# Patient Record
Sex: FEMALE | Race: WHITE | NOT HISPANIC OR LATINO | Employment: OTHER | URBAN - METROPOLITAN AREA
[De-identification: names, ages, dates, MRNs, and addresses within clinical notes are randomized per-mention and may not be internally consistent; named-entity substitution may affect disease eponyms.]

---

## 2017-04-19 ENCOUNTER — CONVERSION ENCOUNTER (OUTPATIENT)
Dept: MAMMOGRAPHY | Facility: HOSPITAL | Age: 57
End: 2017-04-19

## 2019-06-10 ENCOUNTER — HOSPITAL ENCOUNTER (OUTPATIENT)
Dept: MAMMOGRAPHY | Facility: HOSPITAL | Age: 59
Discharge: HOME | End: 2019-06-10
Attending: FAMILY MEDICINE

## 2019-07-24 ENCOUNTER — HOSPITAL ENCOUNTER (OUTPATIENT)
Dept: LAB | Facility: HOSPITAL | Age: 59
Discharge: HOME | End: 2019-07-24
Attending: FAMILY MEDICINE

## 2019-07-24 LAB
25(OH)D3 SERPL-MCNC: 43.3 NG/ML (ref 30–100)
TSH SERPL-ACNC: 3.08 M[IU]/L (ref 0.27–4.2)

## 2019-09-04 ENCOUNTER — HOSPITAL ENCOUNTER (OUTPATIENT)
Dept: URGENT CARE | Facility: CLINIC | Age: 59
Discharge: HOME | End: 2019-09-04

## 2020-12-29 ENCOUNTER — HOSPITAL ENCOUNTER (OUTPATIENT)
Dept: LAB | Facility: HOSPITAL | Age: 60
Discharge: HOME | End: 2020-12-29
Attending: FAMILY MEDICINE

## 2020-12-29 LAB
25(OH)D3 SERPL-MCNC: 28.5 NG/ML (ref 30–100)
ALBUMIN SERPL-MCNC: 4.1 G/DL (ref 3.5–5)
ALBUMIN/GLOB SERPL: 1.5 {RATIO} (ref 1.4–2.6)
ALP SERPL-CCNC: 104 U/L (ref 53–141)
ALT SERPL-CCNC: 35 U/L (ref 10–40)
ANION GAP SERPL CALC-SCNC: 14 MMOL/L (ref 8–19)
AST SERPL-CCNC: 30 U/L (ref 15–50)
BILIRUB SERPL-MCNC: 0.24 MG/DL (ref 0.2–1.3)
BUN SERPL-MCNC: 16 MG/DL (ref 5–25)
BUN/CREAT SERPL: 18 {RATIO} (ref 6–20)
CALCIUM SERPL-MCNC: 9.4 MG/DL (ref 8.7–10.4)
CHLORIDE SERPL-SCNC: 105 MMOL/L (ref 99–111)
CHOLEST SERPL-MCNC: 224 MG/DL (ref 107–200)
CHOLEST/HDLC SERPL: 4.1 {RATIO} (ref 3–6)
CONV CO2: 28 MMOL/L (ref 22–32)
CONV TOTAL PROTEIN: 6.8 G/DL (ref 6.3–8.2)
CREAT UR-MCNC: 0.89 MG/DL (ref 0.5–0.9)
GFR SERPLBLD BASED ON 1.73 SQ M-ARVRAT: >60 ML/MIN/{1.73_M2}
GLOBULIN UR ELPH-MCNC: 2.7 G/DL (ref 2–3.5)
GLUCOSE SERPL-MCNC: 108 MG/DL (ref 65–99)
HDLC SERPL-MCNC: 54 MG/DL (ref 40–60)
LDLC SERPL CALC-MCNC: 128 MG/DL (ref 70–100)
OSMOLALITY SERPL CALC.SUM OF ELEC: 296 MOSM/KG (ref 273–304)
POTASSIUM SERPL-SCNC: 4.8 MMOL/L (ref 3.5–5.3)
SODIUM SERPL-SCNC: 142 MMOL/L (ref 135–147)
TRIGL SERPL-MCNC: 210 MG/DL (ref 40–150)
TSH SERPL-ACNC: 2.45 M[IU]/L (ref 0.27–4.2)
VLDLC SERPL-MCNC: 42 MG/DL (ref 5–37)

## 2021-05-13 ENCOUNTER — HOSPITAL ENCOUNTER (OUTPATIENT)
Dept: URGENT CARE | Facility: CLINIC | Age: 61
Discharge: HOME | End: 2021-05-13
Attending: EMERGENCY MEDICINE

## 2021-05-20 ENCOUNTER — OFFICE VISIT CONVERTED (OUTPATIENT)
Dept: FAMILY MEDICINE CLINIC | Facility: CLINIC | Age: 61
End: 2021-05-20
Attending: NURSE PRACTITIONER

## 2021-05-20 ENCOUNTER — HOSPITAL ENCOUNTER (OUTPATIENT)
Dept: FAMILY MEDICINE CLINIC | Facility: CLINIC | Age: 61
Discharge: HOME | End: 2021-05-20
Attending: NURSE PRACTITIONER

## 2021-05-20 LAB
25(OH)D3 SERPL-MCNC: 32.1 NG/ML (ref 30–100)
ALBUMIN SERPL-MCNC: 4.3 G/DL (ref 3.5–5)
ALBUMIN/GLOB SERPL: 1.2 {RATIO} (ref 1.4–2.6)
ALP SERPL-CCNC: 103 U/L (ref 53–141)
ALT SERPL-CCNC: 21 U/L (ref 10–40)
ANION GAP SERPL CALC-SCNC: 17 MMOL/L (ref 8–19)
AST SERPL-CCNC: 21 U/L (ref 15–50)
BASOPHILS # BLD AUTO: 0.05 10*3/UL (ref 0–0.2)
BASOPHILS NFR BLD AUTO: 1 % (ref 0–3)
BILIRUB SERPL-MCNC: 0.34 MG/DL (ref 0.2–1.3)
BUN SERPL-MCNC: 19 MG/DL (ref 5–25)
BUN/CREAT SERPL: 22 {RATIO} (ref 6–20)
CALCIUM SERPL-MCNC: 9.7 MG/DL (ref 8.7–10.4)
CHLORIDE SERPL-SCNC: 102 MMOL/L (ref 99–111)
CHOLEST SERPL-MCNC: 258 MG/DL (ref 107–200)
CHOLEST/HDLC SERPL: 4.5 {RATIO} (ref 3–6)
CONV ABS IMM GRAN: 0.01 10*3/UL (ref 0–0.2)
CONV CO2: 27 MMOL/L (ref 22–32)
CONV IMMATURE GRAN: 0.2 % (ref 0–1.8)
CONV TOTAL PROTEIN: 7.8 G/DL (ref 6.3–8.2)
CREAT UR-MCNC: 0.87 MG/DL (ref 0.5–0.9)
DEPRECATED RDW RBC AUTO: 44.9 FL (ref 36.4–46.3)
EOSINOPHIL # BLD AUTO: 0.24 10*3/UL (ref 0–0.7)
EOSINOPHIL # BLD AUTO: 4.7 % (ref 0–7)
ERYTHROCYTE [DISTWIDTH] IN BLOOD BY AUTOMATED COUNT: 13.6 % (ref 11.7–14.4)
GFR SERPLBLD BASED ON 1.73 SQ M-ARVRAT: >60 ML/MIN/{1.73_M2}
GLOBULIN UR ELPH-MCNC: 3.5 G/DL (ref 2–3.5)
GLUCOSE SERPL-MCNC: 110 MG/DL (ref 65–99)
HCT VFR BLD AUTO: 41.7 % (ref 37–47)
HDLC SERPL-MCNC: 57 MG/DL (ref 40–60)
HGB BLD-MCNC: 13.4 G/DL (ref 12–16)
LDLC SERPL CALC-MCNC: 166 MG/DL (ref 70–100)
LYMPHOCYTES # BLD AUTO: 1.69 10*3/UL (ref 1–5)
LYMPHOCYTES NFR BLD AUTO: 33.1 % (ref 20–45)
MCH RBC QN AUTO: 29 PG (ref 27–31)
MCHC RBC AUTO-ENTMCNC: 32.1 G/DL (ref 33–37)
MCV RBC AUTO: 90.3 FL (ref 81–99)
MONOCYTES # BLD AUTO: 0.53 10*3/UL (ref 0.2–1.2)
MONOCYTES NFR BLD AUTO: 10.4 % (ref 3–10)
NEUTROPHILS # BLD AUTO: 2.58 10*3/UL (ref 2–8)
NEUTROPHILS NFR BLD AUTO: 50.6 % (ref 30–85)
NRBC CBCN: 0 % (ref 0–0.7)
OSMOLALITY SERPL CALC.SUM OF ELEC: 295 MOSM/KG (ref 273–304)
PLATELET # BLD AUTO: 236 10*3/UL (ref 130–400)
PMV BLD AUTO: 11.2 FL (ref 9.4–12.3)
POTASSIUM SERPL-SCNC: 4.9 MMOL/L (ref 3.5–5.3)
RBC # BLD AUTO: 4.62 10*6/UL (ref 4.2–5.4)
SODIUM SERPL-SCNC: 141 MMOL/L (ref 135–147)
TRIGL SERPL-MCNC: 176 MG/DL (ref 40–150)
TSH SERPL-ACNC: 1.53 M[IU]/L (ref 0.27–4.2)
VLDLC SERPL-MCNC: 35 MG/DL (ref 5–37)
WBC # BLD AUTO: 5.1 10*3/UL (ref 4.8–10.8)

## 2021-05-21 LAB
DSDNA AB SER-ACNC: NEGATIVE [IU]/ML
ENA AB SER IA-ACNC: NEGATIVE {RATIO}

## 2021-05-23 ENCOUNTER — TRANSCRIBE ORDERS (OUTPATIENT)
Dept: ADMINISTRATIVE | Facility: HOSPITAL | Age: 61
End: 2021-05-23

## 2021-05-23 DIAGNOSIS — Z12.39 SCREENING BREAST EXAMINATION: Primary | ICD-10-CM

## 2021-06-05 NOTE — H&P
History and Physical      Patient Name: Ana Plasencia   Patient ID: [id number]   Sex: Female   YOB: 1960    Primary Care Provider: Nereyda PURI    Visit Date: May 20, 2021    Provider: JAXSON Schaffer   Location: [facility]   Location Address: [address] [place] [address]  [id number]   Location Phone: [phone]          Chief Complaint  · New Patient/Establish Care      History Of Present Illness  Ana Plasencia is a 61 year old /White female who presents for evaluation and treatment of:      New Patient/Establish Care  Previous PCP was Dr. Jimenez  PMx of HTN, Allergic Rhinitis, Arthritis, Hypothyroidism, and Vitamin D Deficiency  Last lab work done 12/29/20    Pt c/o blood pressure issues and needing lab work to check thyroid and Vitamin B and Vitamin D levels.    b/p elevated with school nurse x 2 and at urgent care x 2. Pt reports b/p at home was 170 systolic. pt thinks her dad had HTN.     Pt reported she was having discomfort in Rt eye, was seen by the school nurse. Told BP was elevated and needed to have eye checked further. Went to Urgent care and given Lotemax drops.  Then was told to follow up with Eye Dr.  Was told she probably had something in her eye, Has improved since and finished drops.    Pt also reported having glaucoma and was taking loratidine allergy med OTC. Pt is concerned about taking any longer due to her  read that she should not take if she has glaucoma.    Anxeity/depression - pt feels like she no longer needs medication. pt will reduce to 5 mg paxil daily and stop if doing well in 1 month.     flu shot-10/2020  Pap-2020 EPW  Mammo-6/10/19  Colon- 2020 Cologuard         Past Medical History  Disease Name Date Onset Notes   Allergic rhinitis --  --    Arthritis --  --    Back pain --  --    Colon cancer screening 2020 Cologuard- Negative   Essential hypertension --  --    Gout --  --    Hypothyroidism --   --    Memory loss/Forgetfulness --  --    Migraine headache --  --    Night sweats --  --    Pap smear for cervical cancer screening  EPW   Postmenopausal --  --    Screening Mammogram  --          Past Surgical History  Procedure Name Date Notes   Tonsilectomy --  --    [place] Tooth Extraction --  --          Medication List  Name Date Started Instructions   levothyroxine 50 mcg oral tablet  take 1 tablet (50 mcg) by oral route once daily   paroxetine HCl 10 mg oral tablet  take 1 tablet (10 mg) by oral route once daily   travoprost 0.004 % ophthalmic (eye) drops  instill 1 drop into affected eye(s) by ophthalmic route once daily in the evening   Vitamin D3 50 mcg (2,000 unit) oral tablet  take 1 tablet by oral route every other day         Allergy List  Allergen Name Date Reaction Notes   NO KNOWN DRUG ALLERGIES --  --  --          Family Medical History  Disease Name Relative/Age Notes   Family history of stroke Grandmother (maternal)/   --    Family history of heart disease Father/   --    Family history of diabetes mellitus Father/  Sister/   --    Family history of Alzheimer's disease Mother/   --          Reproductive History  Menstrual   Menopause Status: Postmenopausal Age Menopause: 52   Pregnancy Summary   Total Pregnancies: 1 Full Term: 0 Premature: 0   Ab Induced: 0 Ab Spontaneous: 0 Ectopics: 0   Multiples: 0 Livin         Social History  Finding Status Start/Stop Quantity Notes   Active but no formal exercise --  --/-- --  --    Alcohol Never --/-- --  --    Denies illicit substance abuse --  --/-- --  --    Denies substance abuse --  --/-- --  --    Teacher --  --/-- --  --    Tobacco Never --/-- --  --          Review of Systems  · Constitutional  o Denies  o : fatigue, fever, weight gain, weight loss, chills  · Cardiovascular  o Denies  o : chest Pain, palpitations, edema (swelling)  · Respiratory  o Denies  o : frequent cough, shortness of breath  · Gastrointestinal  o Denies  o :  "nausea, vomiting, changes in bowel habits  · Genitourinary  o Denies  o : dysuria, urinary frequency, urinary urgency, polyuria  · Neurologic  o Admits  o : headache  o Denies  o : tingling or numbness, dizziness  · Musculoskeletal  o Denies  o : joint pain, myalgias  · Endocrine  o Denies  o : polydipsia, polyphagia  · Psychiatric  o Denies  o : mood changes, memory changes, SI/HI  · Allergic-Immunologic  o Admits  o : seasonal allergies  o Denies  o : eczema, urticaria      Vitals  Date Time BP Position Site L\R Cuff Size HR RR TEMP (F) WT  HT  BMI kg/m2 BSA m2 O2 Sat FR L/min FiO2 HC       08/22/2014 04:22 /74 Sitting    104 - R 20 97.5 196lbs 7oz 5'  4\" 33.72 2.01 96 %      05/20/2021 08:45 /59 Sitting    68 - R 18 98.7 211lbs 4oz 5'  4\" 36.26 2.08 97 %            Physical Examination  · Constitutional  o Appearance  o : well-nourished, in no acute distress  · Eyes  o Conjunctivae  o : conjunctivae normal  o Sclerae  o : sclerae white  o Pupils and Irises  o : pupils equal and round  o Eyelids/Ocular Adnexae  o : eyelid appearance normal, no exudates present  · Neck  o Inspection/Palpation  o : normal appearance, no masses or tenderness, trachea midline  o Range of Motion  o : cervical range of motion within normal limits  o Thyroid  o : gland size normal, nontender, no nodules or masses present on palpation  · Respiratory  o Respiratory Effort  o : breathing unlabored  o Inspection of Chest  o : normal appearance  o Auscultation of Lungs  o : normal breath sounds throughout inspiration and expiration  · Cardiovascular  o Heart  o :   § Auscultation of Heart  § : regular rate and rhythm, no murmurs, gallops or rubs  o Peripheral Vascular System  o :   § Carotid Arteries  § : normal pulses bilaterally, no bruits present  § Extremities  § : no clubbing or edema  · Gastrointestinal  o Abdominal Examination  o : abdomen nontender to palpation, tone normal without rigidity or guarding, no masses present, " bowel sounds present  · Skin and Subcutaneous Tissue  o General Inspection  o : no rashes or lesions present, no areas of discoloration  o Body Hair  o : hair normal for age, general body hair distribution normal for age  o Digits and Nails  o : no clubbing, cyanosis, deformities or edema present, normal appearing nails  · Neurologic  o Mental Status Examination  o :   § Orientation  § : grossly oriented to person, place and time  o Gait and Station  o : normal gait, able to stand without difficulty  · Psychiatric  o Judgement and Insight  o : judgment and insight intact  o Mood and Affect  o : mood normal, affect appropriate              Assessment  · Screening for depression     V79.0/Z13.89  · Visit for screening mammogram     V76.12/Z12.31  · Allergic rhinitis due to allergen     477.9/J30.9  · Depression     311/F32.9  · Essential hypertension     401.9/I10  · Fatigue     780.79/R53.83  · Hypothyroidism     244.9/E03.9  · Vitamin D deficiency     268.9/E55.9  · Establishing care with new doctor, encounter for     V65.8/Z76.89  · Glaucoma     365.9/H40.9      Plan  · Orders  o Annual depression screening, 15 minutes (01318, ) - V79.0/Z13.89 - 05/20/2021  o ACO-18: Negative screen for clinical depression using a standardized tool () - V79.0/Z13.89 - 05/20/2021  o Screening Mammography; Bilateral 3D (32399, 48201, ) - V76.12/Z12.31 - 05/20/2021  o Vitamin D Level (94085) - 268.9/E55.9 - 05/20/2021  o ACO - Pt declines to or was not able to provide an Advance Care Plan or name a Surrogate Decision Maker (1124F) - - 05/20/2021  o Physical, Primary Care Panel (CBC, CMP, Lipid, TSH) [facility] (81429, 86314, 10171, 68368) - 401.9/I10, 244.9/E03.9, 268.9/E55.9 - 05/20/2021  o ACO-14: Influenza immunization administered or previously received [facility] () - - 05/20/2021  o ACO-39: Current medications updated and reviewed (1159F, ) - - 05/20/2021  o ARUNA (antinuclear antibody profile) by enzyme immunoassay  (41497) - 244.9/E03.9, 780.79/R53.83 - 05/20/2021  · Medications  o amlodipine 5 mg oral tablet   SIG: take 1 tablet (5 mg) by oral route once daily for 30 days   DISP: (30) Tablet with 5 refills  Adjusted on 05/20/2021     o Medications have been Reconciled  o Transition of Care or Provider Policy  · Instructions  o Depression Screen completed and scanned into the EMR under the designated folder within the patient's documents.  o Today's PHQ-9 result is _0__  o Patient was given an SSRI/SSNRI medication and warned of possible side effects of the medication including potential for increased risk of suicidal thoughts and feelings. Patient was instructed that if they begin to exhibit any of these effects they will discontinue the medication immediately and contact our office or the ER ASAP.  o Patient advised to monitor blood pressure (B/P) at home and journal readings. Patient informed that a B/P reading at home of more than 130/80 is considered hypertension. For readings greater zokn801/90 or higher patient is advised to follow up in the office with readings for management. Patient advised to limit sodium intake.  o Patient instructed/educated on their diet and exercise program.  o Patient was educated/instructed on their diagnosis, treatment and medications prior to discharge from the clinic today.  o Call the office with any concerns or questions.  · Disposition  o Return to Office in 6 months.            Electronically Signed by: JAXSON Schaffer -Author on May 20, 2021 09:30:33 AM

## 2021-06-18 ENCOUNTER — TELEPHONE (OUTPATIENT)
Dept: FAMILY MEDICINE CLINIC | Facility: CLINIC | Age: 61
End: 2021-06-18

## 2021-06-18 NOTE — TELEPHONE ENCOUNTER
Caller: Ana Plasencia    Relationship: Self    Best call back number: [phone]    What test was performed: BLOOD WORK     When was the test performed: PATIENT COULD NOT PROVIDE DATES, STATES IT WAS OVER A MONTH AGO     Where was the test performed: IN OFFICE

## 2021-06-18 NOTE — TELEPHONE ENCOUNTER
Called pt back, she had looked at her lab results on the portal from 5/20/21 and concerned that her vitamin D level was low and her cholesterol was elevated.-- Advised that Vitamin D was WNL, but can continue to take 2,000IU of Vitamin D as previously advised and cholesterol was abnormal (slightly elevated) but not enough for medication, work on low cholesterol diet and exercise to bring it down naturally.

## 2021-07-15 VITALS
TEMPERATURE: 98.7 F | WEIGHT: 211.25 LBS | RESPIRATION RATE: 18 BRPM | HEIGHT: 64 IN | BODY MASS INDEX: 36.06 KG/M2 | HEART RATE: 68 BPM | DIASTOLIC BLOOD PRESSURE: 59 MMHG | OXYGEN SATURATION: 97 % | SYSTOLIC BLOOD PRESSURE: 133 MMHG

## 2021-09-16 ENCOUNTER — HOSPITAL ENCOUNTER (OUTPATIENT)
Dept: MAMMOGRAPHY | Facility: HOSPITAL | Age: 61
Discharge: HOME | End: 2021-09-16
Admitting: NURSE PRACTITIONER

## 2021-09-16 DIAGNOSIS — Z12.39 SCREENING BREAST EXAMINATION: ICD-10-CM

## 2021-09-16 PROCEDURE — 77063 BREAST TOMOSYNTHESIS BI: CPT

## 2021-09-16 PROCEDURE — 77067 SCR MAMMO BI INCL CAD: CPT

## 2021-11-04 RX ORDER — PAROXETINE 10 MG/1
10 TABLET, FILM COATED ORAL DAILY
Qty: 90 TABLET | Refills: 0 | Status: SHIPPED | OUTPATIENT
Start: 2021-11-04 | End: 2021-11-22 | Stop reason: SDUPTHER

## 2021-11-04 NOTE — TELEPHONE ENCOUNTER
Caller: Ana Plasencia    Relationship: Self      Medication requested (name and dosage): PARoxetine (PAXIL) 10 MG tablet    Requested Prescriptions:   Requested Prescriptions     Pending Prescriptions Disp Refills   • PARoxetine (PAXIL) 10 MG tablet          Pharmacy where request should be sent: [address] - [phone]  - [phone]   [phone]    Best call back number: [phone]    Does the patient have less than a 3 day supply:  [x] Yes  [] No    David Umaña Rep   11/04/21 11:55 EDT

## 2021-11-22 ENCOUNTER — OFFICE VISIT (OUTPATIENT)
Dept: FAMILY MEDICINE CLINIC | Facility: CLINIC | Age: 61
End: 2021-11-22

## 2021-11-22 VITALS
WEIGHT: 218 LBS | SYSTOLIC BLOOD PRESSURE: 132 MMHG | TEMPERATURE: 99.7 F | DIASTOLIC BLOOD PRESSURE: 72 MMHG | OXYGEN SATURATION: 97 % | HEART RATE: 73 BPM | HEIGHT: 64 IN | BODY MASS INDEX: 37.22 KG/M2

## 2021-11-22 DIAGNOSIS — E03.9 HYPOTHYROIDISM, UNSPECIFIED TYPE: ICD-10-CM

## 2021-11-22 DIAGNOSIS — I10 ESSENTIAL HYPERTENSION: Primary | ICD-10-CM

## 2021-11-22 DIAGNOSIS — Z23 NEED FOR INFLUENZA VACCINATION: ICD-10-CM

## 2021-11-22 DIAGNOSIS — F33.41 RECURRENT MAJOR DEPRESSIVE DISORDER, IN PARTIAL REMISSION (HCC): ICD-10-CM

## 2021-11-22 DIAGNOSIS — E55.9 VITAMIN D DEFICIENCY: ICD-10-CM

## 2021-11-22 LAB
25(OH)D3 SERPL-MCNC: 37.3 NG/ML
ALBUMIN SERPL-MCNC: 4.5 G/DL (ref 3.5–5.2)
ALBUMIN/GLOB SERPL: 1.6 G/DL
ALP SERPL-CCNC: 123 U/L (ref 39–117)
ALT SERPL W P-5'-P-CCNC: 23 U/L (ref 1–33)
ANION GAP SERPL CALCULATED.3IONS-SCNC: 10.5 MMOL/L (ref 5–15)
AST SERPL-CCNC: 22 U/L (ref 1–32)
BASOPHILS # BLD AUTO: 0.04 10*3/MM3 (ref 0–0.2)
BASOPHILS NFR BLD AUTO: 0.7 % (ref 0–1.5)
BILIRUB SERPL-MCNC: 0.2 MG/DL (ref 0–1.2)
BUN SERPL-MCNC: 19 MG/DL (ref 6–20)
BUN/CREAT SERPL: 19.8 (ref 7–25)
CALCIUM SPEC-SCNC: 9.6 MG/DL (ref 8.6–10.5)
CHLORIDE SERPL-SCNC: 103 MMOL/L (ref 98–107)
CHOLEST SERPL-MCNC: 261 MG/DL (ref 0–200)
CO2 SERPL-SCNC: 26.5 MMOL/L (ref 22–29)
CREAT SERPL-MCNC: 0.96 MG/DL (ref 0.57–1)
DEPRECATED RDW RBC AUTO: 44.3 FL (ref 37–54)
EOSINOPHIL # BLD AUTO: 0.26 10*3/MM3 (ref 0–0.4)
EOSINOPHIL NFR BLD AUTO: 4.5 % (ref 0.3–6.2)
ERYTHROCYTE [DISTWIDTH] IN BLOOD BY AUTOMATED COUNT: 13.8 % (ref 12.3–15.4)
GFR SERPL CREATININE-BSD FRML MDRD: 59 ML/MIN/1.73
GLOBULIN UR ELPH-MCNC: 2.9 GM/DL
GLUCOSE SERPL-MCNC: 91 MG/DL (ref 65–99)
HCT VFR BLD AUTO: 37.7 % (ref 34–46.6)
HDLC SERPL-MCNC: 56 MG/DL (ref 40–60)
HGB BLD-MCNC: 12.4 G/DL (ref 12–15.9)
IMM GRANULOCYTES # BLD AUTO: 0.01 10*3/MM3 (ref 0–0.05)
IMM GRANULOCYTES NFR BLD AUTO: 0.2 % (ref 0–0.5)
LDLC SERPL CALC-MCNC: 160 MG/DL (ref 0–100)
LDLC/HDLC SERPL: 2.79 {RATIO}
LYMPHOCYTES # BLD AUTO: 1.88 10*3/MM3 (ref 0.7–3.1)
LYMPHOCYTES NFR BLD AUTO: 32.5 % (ref 19.6–45.3)
MCH RBC QN AUTO: 28.8 PG (ref 26.6–33)
MCHC RBC AUTO-ENTMCNC: 32.9 G/DL (ref 31.5–35.7)
MCV RBC AUTO: 87.7 FL (ref 79–97)
MONOCYTES # BLD AUTO: 0.56 10*3/MM3 (ref 0.1–0.9)
MONOCYTES NFR BLD AUTO: 9.7 % (ref 5–12)
NEUTROPHILS NFR BLD AUTO: 3.04 10*3/MM3 (ref 1.7–7)
NEUTROPHILS NFR BLD AUTO: 52.4 % (ref 42.7–76)
NRBC BLD AUTO-RTO: 0 /100 WBC (ref 0–0.2)
PLATELET # BLD AUTO: 221 10*3/MM3 (ref 140–450)
PMV BLD AUTO: 11.3 FL (ref 6–12)
POTASSIUM SERPL-SCNC: 4.4 MMOL/L (ref 3.5–5.2)
PROT SERPL-MCNC: 7.4 G/DL (ref 6–8.5)
RBC # BLD AUTO: 4.3 10*6/MM3 (ref 3.77–5.28)
SODIUM SERPL-SCNC: 140 MMOL/L (ref 136–145)
T4 FREE SERPL-MCNC: 1.09 NG/DL (ref 0.93–1.7)
TRIGL SERPL-MCNC: 245 MG/DL (ref 0–150)
TSH SERPL DL<=0.05 MIU/L-ACNC: 3.45 UIU/ML (ref 0.27–4.2)
VLDLC SERPL-MCNC: 45 MG/DL (ref 5–40)
WBC NRBC COR # BLD: 5.79 10*3/MM3 (ref 3.4–10.8)

## 2021-11-22 PROCEDURE — 80061 LIPID PANEL: CPT | Performed by: NURSE PRACTITIONER

## 2021-11-22 PROCEDURE — 90686 IIV4 VACC NO PRSV 0.5 ML IM: CPT | Performed by: NURSE PRACTITIONER

## 2021-11-22 PROCEDURE — 99214 OFFICE O/P EST MOD 30 MIN: CPT | Performed by: NURSE PRACTITIONER

## 2021-11-22 PROCEDURE — 84439 ASSAY OF FREE THYROXINE: CPT | Performed by: NURSE PRACTITIONER

## 2021-11-22 PROCEDURE — 80053 COMPREHEN METABOLIC PANEL: CPT | Performed by: NURSE PRACTITIONER

## 2021-11-22 PROCEDURE — 84443 ASSAY THYROID STIM HORMONE: CPT | Performed by: NURSE PRACTITIONER

## 2021-11-22 PROCEDURE — 90471 IMMUNIZATION ADMIN: CPT | Performed by: NURSE PRACTITIONER

## 2021-11-22 PROCEDURE — 82306 VITAMIN D 25 HYDROXY: CPT | Performed by: NURSE PRACTITIONER

## 2021-11-22 PROCEDURE — 85025 COMPLETE CBC W/AUTO DIFF WBC: CPT | Performed by: NURSE PRACTITIONER

## 2021-11-22 RX ORDER — AMLODIPINE BESYLATE 5 MG/1
5 TABLET ORAL DAILY
Qty: 90 TABLET | Refills: 1 | Status: SHIPPED | OUTPATIENT
Start: 2021-11-22 | End: 2022-01-26

## 2021-11-22 RX ORDER — AMLODIPINE BESYLATE 5 MG/1
TABLET ORAL
Qty: 30 TABLET | Refills: 1 | Status: SHIPPED | OUTPATIENT
Start: 2021-11-22 | End: 2021-11-22 | Stop reason: SDUPTHER

## 2021-11-22 RX ORDER — HYDROCHLOROTHIAZIDE 12.5 MG/1
12.5 TABLET ORAL DAILY
Qty: 90 TABLET | Refills: 1 | Status: SHIPPED | OUTPATIENT
Start: 2021-11-22 | End: 2022-06-02

## 2021-11-22 RX ORDER — PAROXETINE 10 MG/1
10 TABLET, FILM COATED ORAL DAILY
Qty: 90 TABLET | Refills: 1 | Status: SHIPPED | OUTPATIENT
Start: 2021-11-22 | End: 2022-02-16 | Stop reason: SDUPTHER

## 2021-11-22 NOTE — PATIENT INSTRUCTIONS
Edema    Edema is when you have too much fluid in your body or under your skin. Edema may make your legs, feet, and ankles swell up. Swelling is also common in looser tissues, like around your eyes. This is a common condition. It gets more common as you get older. There are many possible causes of edema. Eating too much salt (sodium) and being on your feet or sitting for a long time can cause edema in your legs, feet, and ankles. Hot weather may make edema worse.  Edema is usually painless. Your skin may look swollen or shiny.  Follow these instructions at home:  · Keep the swollen body part raised (elevated) above the level of your heart when you are sitting or lying down.  · Do not sit still or stand for a long time.  · Do not wear tight clothes. Do not wear garters on your upper legs.  · Exercise your legs. This can help the swelling go down.  · Wear elastic bandages or support stockings as told by your doctor.  · Eat a low-salt (low-sodium) diet to reduce fluid as told by your doctor.  · Depending on the cause of your swelling, you may need to limit how much fluid you drink (fluid restriction).  · Take over-the-counter and prescription medicines only as told by your doctor.  Contact a doctor if:  · Treatment is not working.  · You have heart, liver, or kidney disease and have symptoms of edema.  · You have sudden and unexplained weight gain.  Get help right away if:  · You have shortness of breath or chest pain.  · You cannot breathe when you lie down.  · You have pain, redness, or warmth in the swollen areas.  · You have heart, liver, or kidney disease and get edema all of a sudden.  · You have a fever and your symptoms get worse all of a sudden.  Summary  · Edema is when you have too much fluid in your body or under your skin.  · Edema may make your legs, feet, and ankles swell up. Swelling is also common in looser tissues, like around your eyes.  · Raise (elevate) the swollen body part above the level of your  heart when you are sitting or lying down.  · Follow your doctor's instructions about diet and how much fluid you can drink (fluid restriction).  This information is not intended to replace advice given to you by your health care provider. Make sure you discuss any questions you have with your health care provider.  Document Revised: 12/21/2018 Document Reviewed: 01/05/2018  [employer] Patient Education © 2021 [employer]

## 2021-11-22 NOTE — PROGRESS NOTES
Chief Complaint  Follow-up, Hypertension, Hypothyroidism, and Depression    Subjective          Ana Plasencia is a 61 y.o. female who presents to [facility]    History of Present Illness    Hypothyroid - pt is taking thyroid med at bedtime.    HTN - well controlled. Pt reports compliance with medication.    Depression - pt reports doing well on medication. Depression well controlled.       PHQ-2 Total Score: 0   PHQ-9 Total Score: 0       Review of Systems   Constitutional: Negative for chills, fatigue and fever.   Respiratory: Negative for cough and shortness of breath.    Cardiovascular: Negative for chest pain and palpitations.   Gastrointestinal: Negative for constipation, diarrhea, nausea and vomiting.   Musculoskeletal: Negative for back pain and neck pain.   Skin: Negative for rash.   Neurological: Negative for dizziness and headaches.   Psychiatric/Behavioral: Negative for sleep disturbance and suicidal ideas. The patient is nervous/anxious.           Medical History: has a past medical history of Allergic rhinitis, Anxiety, Arthritis, Back pain, Depression, Disease of thyroid gland, Essential hypertension, Glaucoma (05/20/2021), Gout, Hypothyroidism, Memory loss, Migraine headache, Night sweats, and Post-menopausal.     Surgical History: has a past surgical history that includes Tonsillectomy and [place] tooth extraction.     Family History: family history includes Alzheimer's disease in her mother; Diabetes in her father and sister; Heart disease in her father; Hypertension in her mother; Stroke in her maternal grandmother.     Social History: reports that she has never smoked. She has never used smokeless tobacco. She reports that she does not drink alcohol and does not use drugs.    Allergies: Patient has no known allergies.      Health Maintenance Due   Topic Date Due   • ANNUAL PHYSICAL  Never done   • TDAP/TD VACCINES (1 - Tdap) Never done   • ZOSTER VACCINE (1 of  "2) Never done   • HEPATITIS C SCREENING  Never done   • PAP SMEAR  Never done   • INFLUENZA VACCINE  08/01/2021   • COVID-19 Vaccine (3 - Booster for Moderna series) 08/09/2021            Current Outpatient Medications:   •  amLODIPine (NORVASC) 5 MG tablet, Take 1 tablet by mouth Daily., Disp: 90 tablet, Rfl: 1  •  bimatoprost (LUMIGAN) 0.01 % ophthalmic drops, 1 drop Every Night., Disp: , Rfl:   •  Cholecalciferol 50 MCG (2000 UT) tablet, Vitamin D3 50 mcg (2,000 unit) oral tablet take 1 tablet by oral route every other day   Active, Disp: , Rfl:   •  levothyroxine (SYNTHROID, LEVOTHROID) 50 MCG tablet, Take 50 mcg by mouth Daily., Disp: , Rfl:   •  Loratadine (Claritin) 10 MG capsule, Take  by mouth., Disp: , Rfl:   •  PARoxetine (PAXIL) 10 MG tablet, Take 1 tablet by mouth Daily., Disp: 90 tablet, Rfl: 1  •  hydroCHLOROthiazide (HYDRODIURIL) 12.5 MG tablet, Take 1 tablet by mouth Daily., Disp: 90 tablet, Rfl: 1  •  ketotifen (ZADITOR) 0.025 % ophthalmic solution, , Disp: , Rfl:   •  Lotemax SM 0.38 % gel, INSTILL ONE DROP RIGHT EYE FOUR TIMES DAILY FOR 5 DAYS THEN TWICE DAILY FOR 5 DAYS, Disp: , Rfl:   •  travoprost, KAREN free, (TRAVATAN) 0.004 % solution ophthalmic solution, , Disp: , Rfl:       Immunization History   Administered Date(s) Administered   • COVID-19 (MODERNA) 1st, 2nd, 3rd Dose Only 01/12/2021, 02/09/2021   • DTaP 09/04/2019   • Flu Vaccine Quad PF >18YRS 10/01/2020         Objective       Vitals:    11/22/21 1505   BP: 132/72   BP Location: Right arm   Patient Position: Sitting   Cuff Size: Adult   Pulse: 73   Temp: 99.7 °F (37.6 °C)   TempSrc: Temporal   SpO2: 97%   Weight: 98.9 kg (218 lb)   Height: 162.6 cm (64.02\")      Body mass index is 37.4 kg/m².   Wt Readings from Last 3 Encounters:   11/22/21 98.9 kg (218 lb)   08/13/21 95.6 kg (210 lb 11.2 oz)   07/31/21 96.7 kg (213 lb 1.6 oz)      BP Readings from Last 3 Encounters:   11/22/21 132/72   08/13/21 161/86   07/31/21 145/71    "     Physical Exam  Vitals reviewed.   Constitutional:       Appearance: Normal appearance. She is well-developed.   HENT:      Head: Normocephalic and atraumatic.   Eyes:      Conjunctiva/sclera: Conjunctivae normal.      Pupils: Pupils are equal, round, and reactive to light.   Cardiovascular:      Rate and Rhythm: Normal rate and regular rhythm.      Heart sounds: Normal heart sounds. No murmur heard.      Pulmonary:      Effort: Pulmonary effort is normal.      Breath sounds: Normal breath sounds. No wheezing or rhonchi.   Abdominal:      General: Bowel sounds are normal. There is no distension.      Palpations: Abdomen is soft.      Tenderness: There is no abdominal tenderness.   Musculoskeletal:      Right lower le+ Edema present.      Left lower le+ Edema present.   Skin:     General: Skin is warm and dry.   Neurological:      Mental Status: She is alert and oriented to person, place, and time.   Psychiatric:         Mood and Affect: Mood and affect normal.         Behavior: Behavior normal.         Thought Content: Thought content normal.         Judgment: Judgment normal.       Result Review :     Common labs    Common Labsle 20    1124 1124    Glucose 108 (A)  110 (A)   BUN 16  19   Creatinine 0.89  0.87   Sodium 142  141   Potassium 4.8  4.9   Chloride 105  102   Calcium 9.4  9.7   Albumin 4.1  4.3   Total Bilirubin 0.24  0.34   Alkaline Phosphatase 104  103   AST (SGOT) 30  21   ALT (SGPT) 35  21   WBC   5.10   Hemoglobin   13.4   Hematocrit   41.7   Platelets   236   Total Cholesterol  224 (A) 258 (A)   Triglycerides  210 (A) 176 (A)   HDL Cholesterol  54 57   LDL Cholesterol   128 (A) 166 (A)   (A) Abnormal value       Comments are available for some flowsheets but are not being displayed.                      Assessment and Plan        Diagnoses and all orders for this visit:    1. Essential hypertension (Primary)  -     CBC and Differential  -     Comprehensive metabolic  panel  -     Lipid panel  -     amLODIPine (NORVASC) 5 MG tablet; Take 1 tablet by mouth Daily.  Dispense: 90 tablet; Refill: 1  -     hydroCHLOROthiazide (HYDRODIURIL) 12.5 MG tablet; Take 1 tablet by mouth Daily.  Dispense: 90 tablet; Refill: 1    2. Hypothyroidism, unspecified type  -     T4, free  -     TSH    3. Vitamin D deficiency  -     Vitamin D 25 hydroxy    4. Need for influenza vaccination  -     FluLaval/Fluarix/Fluzone >6 Months (7214-9478)    5. Recurrent major depressive disorder, in partial remission (HCC)  -     PARoxetine (PAXIL) 10 MG tablet; Take 1 tablet by mouth Daily.  Dispense: 90 tablet; Refill: 1          Follow Up     Return in about 6 months (around 5/22/2022) for Next scheduled follow up.    Patient was given instructions and counseling regarding her condition or for health maintenance advice. Please see specific information pulled into the AVS if appropriate.     JAXSON Schaffer

## 2021-11-24 DIAGNOSIS — E78.5 HYPERLIPIDEMIA, UNSPECIFIED HYPERLIPIDEMIA TYPE: Primary | ICD-10-CM

## 2021-11-24 RX ORDER — PRAVASTATIN SODIUM 20 MG
20 TABLET ORAL NIGHTLY
Qty: 30 TABLET | Refills: 2 | Status: SHIPPED | OUTPATIENT
Start: 2021-11-24 | End: 2022-04-04

## 2022-01-22 ENCOUNTER — APPOINTMENT (OUTPATIENT)
Dept: GENERAL RADIOLOGY | Facility: HOSPITAL | Age: 62
End: 2022-01-22

## 2022-01-22 ENCOUNTER — HOSPITAL ENCOUNTER (EMERGENCY)
Facility: HOSPITAL | Age: 62
Discharge: HOME | End: 2022-01-22
Attending: EMERGENCY MEDICINE | Admitting: EMERGENCY MEDICINE

## 2022-01-22 VITALS
HEART RATE: 83 BPM | OXYGEN SATURATION: 100 % | RESPIRATION RATE: 16 BRPM | SYSTOLIC BLOOD PRESSURE: 177 MMHG | TEMPERATURE: 97.6 F | DIASTOLIC BLOOD PRESSURE: 75 MMHG | HEIGHT: 64 IN | BODY MASS INDEX: 36.62 KG/M2 | WEIGHT: 214.51 LBS

## 2022-01-22 DIAGNOSIS — S43.015A ANTERIOR SHOULDER DISLOCATION, LEFT, INITIAL ENCOUNTER: Primary | ICD-10-CM

## 2022-01-22 PROCEDURE — 25010000002 HYDROMORPHONE 1 MG/ML SOLUTION: Performed by: EMERGENCY MEDICINE

## 2022-01-22 PROCEDURE — 96376 TX/PRO/DX INJ SAME DRUG ADON: CPT

## 2022-01-22 PROCEDURE — 96374 THER/PROPH/DIAG INJ IV PUSH: CPT

## 2022-01-22 PROCEDURE — 73030 X-RAY EXAM OF SHOULDER: CPT

## 2022-01-22 PROCEDURE — 96375 TX/PRO/DX INJ NEW DRUG ADDON: CPT

## 2022-01-22 PROCEDURE — 99283 EMERGENCY DEPT VISIT LOW MDM: CPT

## 2022-01-22 RX ORDER — ETOMIDATE 2 MG/ML
16 INJECTION INTRAVENOUS ONCE
Status: COMPLETED | OUTPATIENT
Start: 2022-01-22 | End: 2022-01-22

## 2022-01-22 RX ORDER — HYDROCODONE BITARTRATE AND ACETAMINOPHEN 5; 325 MG/1; MG/1
1 TABLET ORAL EVERY 6 HOURS PRN
Qty: 15 TABLET | Refills: 0 | Status: SHIPPED | OUTPATIENT
Start: 2022-01-22 | End: 2022-01-22 | Stop reason: SDUPTHER

## 2022-01-22 RX ORDER — HYDROCODONE BITARTRATE AND ACETAMINOPHEN 5; 325 MG/1; MG/1
1 TABLET ORAL EVERY 6 HOURS PRN
Qty: 15 TABLET | Refills: 0 | Status: SHIPPED | OUTPATIENT
Start: 2022-01-22 | End: 2022-06-20

## 2022-01-22 RX ADMIN — HYDROMORPHONE HYDROCHLORIDE 1 MG: 1 INJECTION, SOLUTION INTRAMUSCULAR; INTRAVENOUS; SUBCUTANEOUS at 17:36

## 2022-01-22 RX ADMIN — ETOMIDATE 10 MG: 40 INJECTION, SOLUTION INTRAVENOUS at 18:52

## 2022-01-22 RX ADMIN — HYDROMORPHONE HYDROCHLORIDE 1 MG: 1 INJECTION, SOLUTION INTRAMUSCULAR; INTRAVENOUS; SUBCUTANEOUS at 18:16

## 2022-01-22 NOTE — ED PROVIDER NOTES
Subjective   Patient presents complaining of sudden onset of severe right shoulder pain following a mechanical fall when she slipped on ice earlier today.  She denies additional injury she denies having struck her head.  Has pain with attempts to move the arm.  She denies numbness or weakness to the hand.          Review of Systems   Constitutional: Negative for chills and fever.   HENT: Negative for congestion, ear pain and sore throat.    Eyes: Negative for pain.   Respiratory: Negative for cough, chest tightness and shortness of breath.    Cardiovascular: Negative for chest pain.   Gastrointestinal: Negative for abdominal pain, diarrhea, nausea and vomiting.   Genitourinary: Negative for flank pain and hematuria.   Musculoskeletal: Negative for joint swelling.   Skin: Negative for pallor.   Neurological: Negative for seizures and headaches.   All other systems reviewed and are negative.      Past Medical History:   Diagnosis Date   • Allergic rhinitis    • Anxiety    • Arthritis    • Back pain    • Depression     EMOTION   • Disease of thyroid gland    • Essential hypertension    • Glaucoma 05/20/2021   • Gout    • Hypothyroidism    • Memory loss     MEMORY LOSS/FORGETFULNESS   • Migraine headache    • Night sweats    • Post-menopausal        No Known Allergies    Past Surgical History:   Procedure Laterality Date   • TONSILLECTOMY     • WISDOM TOOTH EXTRACTION         Family History   Problem Relation Age of Onset   • Alzheimer's disease Mother         FAMILY HISTORY OF ALZHEIMER'S DISEASE   • Hypertension Mother    • Diabetes Father         FAMILY HISTORY OF DIABETES MELLITUS   • Heart disease Father         FAMILY HISTORY OF HEART DISEASE   • Diabetes Sister         FAMILY HISTORY OF DIABETES MELLITUS   • Stroke Maternal Grandmother         FAMILY HISTORY OF STROKE       Social History     Socioeconomic History   • Marital status:    Tobacco Use   • Smoking status: Never Smoker   • Smokeless tobacco:  "Never Used   Vaping Use   • Vaping Use: Never used   Substance and Sexual Activity   • Alcohol use: Never   • Drug use: Never           Objective   Physical Exam  Constitutional:       Appearance: Normal appearance.   HENT:      Head: Normocephalic and atraumatic.      Nose: Nose normal.      Mouth/Throat:      Mouth: Mucous membranes are moist.   Eyes:      Extraocular Movements: Extraocular movements intact.      Conjunctiva/sclera: Conjunctivae normal.      Pupils: Pupils are equal, round, and reactive to light.   Cardiovascular:      Rate and Rhythm: Normal rate and regular rhythm.      Pulses: Normal pulses.      Heart sounds: Normal heart sounds.   Pulmonary:      Effort: Pulmonary effort is normal.      Breath sounds: Normal breath sounds. No wheezing.   Abdominal:      Palpations: Abdomen is soft.      Tenderness: There is no abdominal tenderness.   Musculoskeletal:         General: Normal range of motion.      Cervical back: Normal range of motion and neck supple.      Right lower leg: No edema.      Left lower leg: No edema.      Comments: There is tenderness to the lateral aspect of the right shoulder without associated deformity.   Skin:     General: Skin is warm and dry.      Capillary Refill: Capillary refill takes less than 2 seconds.      Findings: No rash.   Neurological:      General: No focal deficit present.      Mental Status: She is alert and oriented to person, place, and time. Mental status is at baseline.      Cranial Nerves: No cranial nerve deficit.      Sensory: No sensory deficit.      Motor: No weakness.   Psychiatric:         Mood and Affect: Mood normal.         Behavior: Behavior normal.         Procedures  Following informed consent patient underwent conscious sedation with administration of intravenous etomidate.  After adequate sedation was obtained using traction countertraction the dislocated shoulder was reduced.  Patient tolerated procedure well and post procedure x-rays of\" " stated anatomic alignment of the shoulder with patient neurovascular intact distal to the shoulder.  The shoulder was immobilized and the patient was discharged stable.         ED Course                                                 MDM    Final diagnoses:   Anterior shoulder dislocation, left, initial encounter       ED Disposition  ED Disposition     ED Disposition Condition Comment    Discharge Stable           Colton Anderson MD  [address] [place] [address]  [place] [address]  [phone]    Schedule an appointment as soon as possible for a visit            Medication List      New Prescriptions    HYDROcodone-acetaminophen 5-325 MG per tablet  Commonly known as: NORCO  Take 1 tablet by mouth Every 6 (Six) Hours As Needed for Moderate Pain .           Where to Get Your Medications      These medications were sent to [id number] - [place], [place] - [address] AT [facility] - [phone]  - [phone] FX  [address] [place] [address]    Phone: [phone]   · HYDROcodone-acetaminophen 5-325 MG per tablet          Buster Lau MD  01/22/22 2055

## 2022-01-23 NOTE — DISCHARGE INSTRUCTIONS
Wear the immobilizer however you may remove it to take a shower.  Ice to the shoulder as needed for discomfort.

## 2022-01-24 ENCOUNTER — TELEPHONE (OUTPATIENT)
Dept: ORTHOPEDIC SURGERY | Facility: CLINIC | Age: 62
End: 2022-01-24

## 2022-01-24 NOTE — TELEPHONE ENCOUNTER
REF. FROM [facility]- DX. - RIGHT SHOULDER DISLOCATION- ER PUT BACK IN PLACE, RIGHT SHOULDER PAIN- PRG. NOTES- 01/22/22- [facility] NOTES- IMAGING- 01/22/22- RIGHT SHOULDER XRAY

## 2022-01-25 ENCOUNTER — OFFICE VISIT (OUTPATIENT)
Dept: ORTHOPEDIC SURGERY | Facility: CLINIC | Age: 62
End: 2022-01-25

## 2022-01-25 VITALS — HEART RATE: 100 BPM | OXYGEN SATURATION: 96 % | BODY MASS INDEX: 37.36 KG/M2 | HEIGHT: 64 IN | WEIGHT: 218.8 LBS

## 2022-01-25 DIAGNOSIS — S43.004A SHOULDER DISLOCATION, RIGHT, INITIAL ENCOUNTER: Primary | ICD-10-CM

## 2022-01-25 DIAGNOSIS — I10 ESSENTIAL HYPERTENSION: ICD-10-CM

## 2022-01-25 PROCEDURE — 99203 OFFICE O/P NEW LOW 30 MIN: CPT | Performed by: ORTHOPAEDIC SURGERY

## 2022-01-25 NOTE — PROGRESS NOTES
"Chief Complaint  Pain of the Right Shoulder     Subjective      Ana Plasencia presents to [facility] for patient presents for evaluation of right shoulder pain, right shoulder dislocation,.  Patient states on 1/22/2022 she was at her father's house she states she was walking on the sidewalk slipped on ice and fell and landed on her right shoulder.  Patient states she had severe right shoulder pain she went to the ER, they did x-rays revealing anterior shoulder dislocation, her shoulder was reduced in the ER she states she is having trouble moving her shoulder secondary to her pain and inability to move it.  She states her elbow wrist and hand are doing well.  Patient presents in her sling, they gave her [place] from the ER.  Patient denies numbness and tingling, denies swelling or bruising.  Patient denies prior surgery or dislocation to her shoulder.    No Known Allergies     Social History     Socioeconomic History   • Marital status:    Tobacco Use   • Smoking status: Never Smoker   • Smokeless tobacco: Never Used   Vaping Use   • Vaping Use: Never used   Substance and Sexual Activity   • Alcohol use: Never   • Drug use: Never        Review of Systems     Objective   Vital Signs:   Pulse 100   Ht 162.6 cm (64\")   Wt 99.2 kg (218 lb 12.8 oz)   SpO2 96%   BMI 37.56 kg/m²       Physical Exam  Constitutional:       Appearance: Normal appearance. The patient is well-developed and normal weight.   HENT:      Head: Normocephalic.      Right Ear: Hearing and external ear normal.      Left Ear: Hearing and external ear normal.      Nose: Nose normal.   Eyes:      Conjunctiva/sclera: Conjunctivae normal.   Cardiovascular:      Rate and Rhythm: Normal rate.   Pulmonary:      Effort: Pulmonary effort is normal.      Breath sounds: No wheezing or rales.   Abdominal:      Palpations: Abdomen is soft.      Tenderness: There is no abdominal tenderness.   Musculoskeletal:      Cervical " back: Normal range of motion.   Skin:     Findings: No rash.   Neurological:      Mental Status: The patient is alert and oriented to person, place, and time.   Psychiatric:         Mood and Affect: Mood and affect normal.         Judgment: Judgment normal.       Ortho Exam      Right shoulder: Skin is intact, no erythema, no ecchymosis, no swelling, tenderness to palpation of anterior lateral shoulder, pain with attempted shoulder range of motion, patient is not tolerating active or passive range of motion of her shoulder, elbow wrist hand range of motion appropriate, neurovascularly intact, patient will wiggle fingers and thumb, touch his thumb to each finger, 2+ capillary refill, positive pulses.    Procedures        Imaging Results (Most Recent)     None           Result Review :       XR Shoulder 2+ View Right    Result Date: 1/22/2022  Narrative: PROCEDURE: XR SHOULDER 2+ VW RIGHT  COMPARISON: [facility], [place], XR SHOULDER 2+ VW RIGHT, 1/22/2022, 16:55.  INDICATIONS: RIGHT SHOULDER POST REDUCTION.  FINDINGS: Two views are provided for review.  After closed reduction of the anterior right shoulder dislocation, there is anatomic alignment.  No definite acute fracture is seen.  There may be mild degenerative changes involving the right shoulder.      Impression:  After closed reduction of the anterior right glenohumeral dislocation, there is anatomic alignment.  No definite acute fracture is appreciated.     ROS SAAB JR, MD       Electronically Signed and Approved By: ROS SAAB JR, MD on 1/22/2022 at 19:48             XR Shoulder 2+ View Right    Result Date: 1/22/2022  Narrative: PROCEDURE: XR SHOULDER 2+ VW RIGHT  COMPARISON: None  INDICATIONS: RIGHT SHOULDER PAIN POST FALL  FINDINGS:  The right femoral head is abnormally positioned consistent with anterior shoulder dislocation.  No visualized fracture.  The clavicle is intact.  AC joint maintained in alignment.  The right lung is clear.       Impression:  Anterior shoulder dislocation.      ARNOLD BAEZ MD       Electronically Signed and Approved By: ARNOLD BAEZ MD on 1/22/2022 at 17:15                      Assessment and Plan     DX: Right shoulder dislocation, right shoulder pain    Reviewed x-rays with the patient, advised her we recommend MRI for further evaluation, continue sling use, work on gentle range of motion of elbow hand and wrist, follow-up after MRI.  Patient agreed.    Call or return if worsening symptoms.    Follow Up     After MRI      Patient was given instructions and counseling regarding her condition or for health maintenance advice. Please see specific information pulled into the AVS if appropriate.     Scribed for Colton Anderson MD by ISAIAH Nelson.  01/25/22   13:46 EST    I have personally performed the services described in this document as scribed by the above individual and it is both accurate and complete. Colton Anderson MD 01/25/22

## 2022-01-26 RX ORDER — AMLODIPINE BESYLATE 5 MG/1
TABLET ORAL
Qty: 30 TABLET | Refills: 0 | Status: SHIPPED | OUTPATIENT
Start: 2022-01-26 | End: 2022-03-14

## 2022-02-03 RX ORDER — LEVOTHYROXINE SODIUM 0.05 MG/1
50 TABLET ORAL DAILY
Qty: 90 TABLET | Refills: 1 | Status: SHIPPED | OUTPATIENT
Start: 2022-02-03 | End: 2022-08-26

## 2022-02-03 NOTE — TELEPHONE ENCOUNTER
Caller: Ana Plasencia    Relationship: Self    Best call back number: [phone]     Requested Prescriptions:   Requested Prescriptions     Pending Prescriptions Disp Refills   • levothyroxine (SYNTHROID, LEVOTHROID) 50 MCG tablet       Sig: Take 1 tablet by mouth Daily.        Pharmacy where request should be sent: [address] [phone] [facility] [phone] FX     Additional details provided by patient: COMPLETELY  OUT, RECENT PCP PRESCRIBED RX.     Does the patient have less than a 3 day supply:  [x] Yes  [] No    David uY Rep   02/03/22 10:24 EST

## 2022-02-14 ENCOUNTER — HOSPITAL ENCOUNTER (OUTPATIENT)
Dept: MRI IMAGING | Facility: HOSPITAL | Age: 62
Discharge: HOME | End: 2022-02-14
Admitting: PHYSICIAN ASSISTANT

## 2022-02-14 DIAGNOSIS — S43.004A SHOULDER DISLOCATION, RIGHT, INITIAL ENCOUNTER: ICD-10-CM

## 2022-02-14 PROCEDURE — 73221 MRI JOINT UPR EXTREM W/O DYE: CPT

## 2022-02-16 ENCOUNTER — TELEPHONE (OUTPATIENT)
Dept: FAMILY MEDICINE CLINIC | Facility: CLINIC | Age: 62
End: 2022-02-16

## 2022-02-16 DIAGNOSIS — F33.41 RECURRENT MAJOR DEPRESSIVE DISORDER, IN PARTIAL REMISSION: ICD-10-CM

## 2022-02-16 RX ORDER — PAROXETINE 10 MG/1
10 TABLET, FILM COATED ORAL DAILY
Qty: 90 TABLET | Refills: 1 | Status: SHIPPED | OUTPATIENT
Start: 2022-02-16 | End: 2022-06-20 | Stop reason: SDUPTHER

## 2022-02-16 NOTE — TELEPHONE ENCOUNTER
Caller: Ana Plasencia    Relationship: Self    Best call back number: [phone]    Requested Prescriptions:   Requested Prescriptions     Pending Prescriptions Disp Refills   • PARoxetine (PAXIL) 10 MG tablet 90 tablet 1     Sig: Take 1 tablet by mouth Daily.        Pharmacy where request should be sent: [address] [phone] [facility] [phone] FX         Does the patient have less than a 3 day supply:  [x] Yes  [] No    David Umaña Rep   02/16/22 13:44 EST

## 2022-02-17 ENCOUNTER — OFFICE VISIT (OUTPATIENT)
Dept: ORTHOPEDIC SURGERY | Facility: CLINIC | Age: 62
End: 2022-02-17

## 2022-02-17 VITALS — WEIGHT: 225 LBS | BODY MASS INDEX: 38.41 KG/M2 | HEIGHT: 64 IN

## 2022-02-17 DIAGNOSIS — S42.211A CLOSED FRACTURE OF NECK OF RIGHT HUMERUS, INITIAL ENCOUNTER: ICD-10-CM

## 2022-02-17 DIAGNOSIS — S43.004A SHOULDER DISLOCATION, RIGHT, INITIAL ENCOUNTER: Primary | ICD-10-CM

## 2022-02-17 DIAGNOSIS — S46.011A TRAUMATIC INCOMPLETE TEAR OF RIGHT ROTATOR CUFF, INITIAL ENCOUNTER: ICD-10-CM

## 2022-02-17 DIAGNOSIS — S42.291A HUMERAL HEAD FRACTURE, RIGHT, CLOSED, INITIAL ENCOUNTER: ICD-10-CM

## 2022-02-17 PROCEDURE — 99213 OFFICE O/P EST LOW 20 MIN: CPT | Performed by: ORTHOPAEDIC SURGERY

## 2022-02-17 NOTE — PROGRESS NOTES
"Chief Complaint  Pain of the Right Shoulder     Subjective      Ana Plasencia presents to [facility] for follow up evaluation of the right shoulder. The patient sustained a right shoulder dislocation on 1/22/22 that was reduced in the ER. She recently had an MRI and is here today for those results. She has not being doing therapy. She is still having some decreased ROM.     No Known Allergies     Social History     Socioeconomic History   • Marital status:    Tobacco Use   • Smoking status: Never Smoker   • Smokeless tobacco: Never Used   Vaping Use   • Vaping Use: Never used   Substance and Sexual Activity   • Alcohol use: Never   • Drug use: Never        Review of Systems     Objective   Vital Signs:   Ht 162.6 cm (64\")   Wt 102 kg (225 lb)   BMI 38.62 kg/m²       Physical Exam  Constitutional:       Appearance: Normal appearance. The patient is well-developed and normal weight.   HENT:      Head: Normocephalic.      Right Ear: Hearing and external ear normal.      Left Ear: Hearing and external ear normal.      Nose: Nose normal.   Eyes:      Conjunctiva/sclera: Conjunctivae normal.   Cardiovascular:      Rate and Rhythm: Normal rate.   Pulmonary:      Effort: Pulmonary effort is normal.      Breath sounds: No wheezing or rales.   Abdominal:      Palpations: Abdomen is soft.      Tenderness: There is no abdominal tenderness.   Musculoskeletal:      Cervical back: Normal range of motion.   Skin:     Findings: No rash.   Neurological:      Mental Status: The patient is alert and oriented to person, place, and time.   Psychiatric:         Mood and Affect: Mood and affect normal.         Judgment: Judgment normal.       Ortho Exam      Right shoulder- Forward elevation 80. Abduction 85. External Rotation 60. Internal rotation 55. Sensation to light touch median, radial, ulnar nerve. Positive AIN, PIN, ulnar nerve. Positive pulses. 4+ supraspinatus strength. 5/5 infraspinatus  " And subscapularis     Procedures      Imaging Results (Most Recent)     None           Result Review :       XR Shoulder 2+ View Right    Result Date: 1/22/2022  Narrative: PROCEDURE: XR SHOULDER 2+ VW RIGHT  COMPARISON: [facility], , XR SHOULDER 2+ VW RIGHT, 1/22/2022, 16:55.  INDICATIONS: RIGHT SHOULDER POST REDUCTION.  FINDINGS: Two views are provided for review.  After closed reduction of the anterior right shoulder dislocation, there is anatomic alignment.  No definite acute fracture is seen.  There may be mild degenerative changes involving the right shoulder.      Impression:  After closed reduction of the anterior right glenohumeral dislocation, there is anatomic alignment.  No definite acute fracture is appreciated.     ROS SAAB JR, MD       Electronically Signed and Approved By: ROS SAAB JR, MD on 1/22/2022 at 19:48             XR Shoulder 2+ View Right    Result Date: 1/22/2022  Narrative: PROCEDURE: XR SHOULDER 2+ VW RIGHT  COMPARISON: None  INDICATIONS: RIGHT SHOULDER PAIN POST FALL  FINDINGS:  The right femoral head is abnormally positioned consistent with anterior shoulder dislocation.  No visualized fracture.  The clavicle is intact.  AC joint maintained in alignment.  The right lung is clear.      Impression:  Anterior shoulder dislocation.      ARNOLD BAEZ MD       Electronically Signed and Approved By: ARNOLD BAEZ MD on 1/22/2022 at 17:15             MRI Shoulder Right Without Contrast    Result Date: 2/15/2022  Narrative: PROCEDURE: MRI SHOULDER RIGHT WO CONTRAST  COMPARISON: [facility], , XR SHOULDER 2+ VW RIGHT, 1/22/2022, 19:05.  INDICATIONS: right shoulder dislocation and pain      TECHNIQUE: A variety of imaging planes and parameters were utilized for visualization of suspected pathology.  Images were performed without contrast.   FINDINGS:  Moderate T2 high signal consistent with edema is noted in the humeral greater tuberosity and humeral neck.   There is a nondisplaced fracture in the surgical neck.  Along the posterolateral aspect of the humeral head is an impaction fracture measuring 0.9 cm transverse and 0.4 cm in depth.  No other fractures identified.  Mild acromioclavicular osteoarthritis is noted.  There is a partial thickness articular surface tear of the supraspinatus tendon estimated to involve approximately 80% of the tendon thickness.  The rotator cuff otherwise appears unremarkable.  No muscle body atrophy is seen.  The biceps long head tendon and its attachment to the superior labrum are intact.  No labral tear is identified.  Cartilage in the glenohumeral joint is intact.  No significant joint effusion is seen.  No loose body is evident.      Impression:   1. Impaction fracture along the posterolateral aspect of the humeral head, as above 2. Nondisplaced fracture of the surgical neck with moderate associated marrow edema 3. Mild acromioclavicular osteoarthritis 4. Partial thickness articular surface tear of the supraspinatus tendon      Tom Richard M.D.       Electronically Signed and Approved By: Tom Richard M.D. on 2/15/2022 at 10:55                      Assessment and Plan     DX: Right shoulder dislocation. Humeral head fracture. Humeral neck fracture, partial rotator cuff tear    Discussed the treatment plan with the patient.  Plan for conservative treatment. Order for physical therapy given today.     Call or return if worsening symptoms.    Follow Up     4 weeks with repeat shoulder x-ray      Patient was given instructions and counseling regarding her condition or for health maintenance advice. Please see specific information pulled into the AVS if appropriate.     Scribed for Colton Anderson MD by Deidra Medrano.  02/17/22   15:49 EST    I have personally performed the services described in this document as scribed by the above individual and it is both accurate and complete. Colton Anderson MD 02/17/22

## 2022-03-11 DIAGNOSIS — I10 ESSENTIAL HYPERTENSION: ICD-10-CM

## 2022-03-14 RX ORDER — AMLODIPINE BESYLATE 5 MG/1
TABLET ORAL
Qty: 30 TABLET | Refills: 2 | Status: SHIPPED | OUTPATIENT
Start: 2022-03-14 | End: 2022-06-20 | Stop reason: SDUPTHER

## 2022-03-17 ENCOUNTER — OFFICE VISIT (OUTPATIENT)
Dept: ORTHOPEDIC SURGERY | Facility: CLINIC | Age: 62
End: 2022-03-17

## 2022-03-17 VITALS — HEART RATE: 78 BPM | OXYGEN SATURATION: 97 % | WEIGHT: 225 LBS | BODY MASS INDEX: 38.41 KG/M2 | HEIGHT: 64 IN

## 2022-03-17 DIAGNOSIS — S42.291A HUMERAL HEAD FRACTURE, RIGHT, CLOSED, INITIAL ENCOUNTER: ICD-10-CM

## 2022-03-17 DIAGNOSIS — S46.011D TRAUMATIC INCOMPLETE TEAR OF RIGHT ROTATOR CUFF, SUBSEQUENT ENCOUNTER: Primary | ICD-10-CM

## 2022-03-17 DIAGNOSIS — S42.211D CLOSED FRACTURE OF NECK OF RIGHT HUMERUS WITH ROUTINE HEALING, SUBSEQUENT ENCOUNTER: ICD-10-CM

## 2022-03-17 DIAGNOSIS — S43.004A SHOULDER DISLOCATION, RIGHT, INITIAL ENCOUNTER: ICD-10-CM

## 2022-03-17 PROCEDURE — 99213 OFFICE O/P EST LOW 20 MIN: CPT | Performed by: PHYSICIAN ASSISTANT

## 2022-03-17 NOTE — PROGRESS NOTES
"Chief Complaint  Pain of the Right Shoulder    Subjective          Ana Plasencia is a 62 y.o. female  presents to [facility] for   History of Present Illness    Patient presents for follow-up evaluation of right shoulder pain she had a right shoulder dislocation on 1/22/2022 she was last seen by Dr. Anderson on 2/17/2022 to review her MRI which revealed that she had a right shoulder dislocation/humeral head fracture, humeral neck fracture and partial rotator cuff tear.  Dr. Anderson recommended conservative treatment with starting physical therapy.  Patient states that physical therapy cannot start until about 3/21/2022 she states she is about to start therapy but she has mainly been doing gentle range of motion on her own.  Patient is a  she states that she has pain with certain movements but is able to do most of her work without difficulty.  No Known Allergies     Social History     Socioeconomic History   • Marital status:    Tobacco Use   • Smoking status: Never Smoker   • Smokeless tobacco: Never Used   Vaping Use   • Vaping Use: Never used   Substance and Sexual Activity   • Alcohol use: Never   • Drug use: Never        REVIEW OF SYSTEMS    Constitutional: Denies fevers, chills, weight loss  Cardiovascular: Denies chest pain, shortness of breath  Skin: Denies rashes, acute skin changes  Neurologic: Denies headache, loss of consciousness  MSK: Right shoulder pain      Objective   Vital Signs:   Pulse 78   Ht 162.6 cm (64\")   Wt 102 kg (225 lb)   SpO2 97%   BMI 38.62 kg/m²     Body mass index is 38.62 kg/m².    Physical Exam    Right shoulder: Active forward elevation 95, active abduction 85, external rotation with abduction 60, internal rotation to her side, sensation intact light touch, 4+ supraspinatus, 5 out of 5 infraspinatus and subscapularis.    Procedures    Imaging Results (Most Recent)     Procedure Component Value Units Date/Time "    XR Scapula Right [[id number]] Resulted: 03/17/22 1652     Updated: 03/17/22 1652    Narrative:      • View:AP and Lateral view(s)  • Site: Right shoulder  • Indication: Right shoulder pain  • Study: X-rays ordered, taken in the office, and reviewed today  • X-ray: Good healing of proximal humerus fracture, no increased   displacement or angulation  • Comparative data: No comparative data found             Result Review :   The following data was reviewed by: ISAIAH Nelson on 03/17/2022:  Data reviewed: Radiologic studies Reviewed by me with the patient             Assessment and Plan    Diagnoses and all orders for this visit:    1. Traumatic incomplete tear of right rotator cuff, subsequent encounter (Primary)    2. Closed fracture of neck of right humerus with routine healing, subsequent encounter    3. Humeral head fracture, right, closed, subsequent encounter  -     XR Scapula Right    4. Shoulder dislocation, right, subsequent encounter    5. Humeral head fracture, right, closed, initial encounter  -     XR Scapula Right        Discussed diagnosis and treatment options with the patient patient was advised we recommend continuing plan for physical therapy she will start on 3/21/2022 follow-up in 4 weeks for reevaluation with x-rays.    Call or return if worsening symptoms.    Follow Up   Return in about 4 weeks (around 4/14/2022) for Recheck.  Patient was given instructions and counseling regarding her condition or for health maintenance advice. Please see specific information pulled into the AVS if appropriate.

## 2022-03-21 ENCOUNTER — TREATMENT (OUTPATIENT)
Dept: PHYSICAL THERAPY | Facility: CLINIC | Age: 62
End: 2022-03-21

## 2022-03-21 DIAGNOSIS — M25.511 ACUTE PAIN OF RIGHT SHOULDER: Primary | ICD-10-CM

## 2022-03-21 DIAGNOSIS — M25.60 DECREASED RANGE OF MOTION: ICD-10-CM

## 2022-03-21 DIAGNOSIS — S42.291D HUMERAL HEAD FRACTURE, RIGHT, WITH ROUTINE HEALING, SUBSEQUENT ENCOUNTER: ICD-10-CM

## 2022-03-21 DIAGNOSIS — R29.898 WEAKNESS OF SHOULDER: ICD-10-CM

## 2022-03-21 PROCEDURE — 97161 PT EVAL LOW COMPLEX 20 MIN: CPT | Performed by: PHYSICAL THERAPIST

## 2022-03-21 PROCEDURE — 97110 THERAPEUTIC EXERCISES: CPT | Performed by: PHYSICAL THERAPIST

## 2022-03-21 NOTE — PROGRESS NOTES
Physical Therapy Initial Evaluation and Plan of Care    Patient: Ana Plasencia   : 1960  Diagnosis/ICD-10 Code:  Humeral head fracture, right, closed, initial encounter [S42.291A]  Referring practitioner: Colton Anderson MD  Date of Initial Visit: 3/21/2022  Today's Date: 3/21/2022  Patient seen for 1 sessions           Subjective Questionnaire: QuickDASH: 27/55 = 20-39%       Subjective Evaluation    History of Present Illness  Mechanism of injury: Pt reports on 2022 she fell on some ice and dislocated her R shoulder. Pt went to ED and had shoulder reduced. Pt had MRI that revealed impaction fracture along the posterolateral aspect of the humeral head, nondisplaced fracture of the surgical neck, partial thickness tear of supraspinatus tendon, and mild AC arthritis. Pt reports lifting her R UE above her head increases her pain in addition to reaching behind her back. Pt reports she is a  and reports she is able to work still with R shoulder pain.       Patient Occupation:  Pain  Current pain ratin  At best pain ratin  At worst pain ratin  Quality: sharp and discomfort  Aggravating factors: overhead activity, movement, outstretched reach and repetitive movement    Hand dominance: right    Diagnostic Tests  X-ray: abnormal  MRI studies: abnormal    Treatments  No previous or current treatments  Patient Goals  Patient goals for therapy: increased strength, increased motion, decreased pain and independence with ADLs/IADLs             Objective          Postural Observations  Seated posture: fair  Standing posture: fair        Palpation     Right Tenderness of the supraspinatus.     Tenderness     Right Shoulder  Tenderness in the bicipital groove.     Active Range of Motion   Left Shoulder   Flexion: WFL  Abduction: WFL  External rotation 0°: WFL  External rotation BTH: WFL    Right Shoulder   Flexion: 90 degrees   Abduction: 40 degrees   External  rotation 0°: 60 degrees   External rotation BTH: C2     Additional Active Range of Motion Details  R shoulder IR: R hip    Passive Range of Motion   Left Shoulder   Flexion: WFL  Abduction: WFL  External rotation 45°: WFL  Internal rotation 0°: WFL    Right Shoulder   Flexion: 130 degrees   Abduction: 90 degrees   External rotation 45°: 60 degrees   Internal rotation 0°: 60 degrees     Strength/Myotome Testing     Left Shoulder     Planes of Motion   Flexion: 5   Abduction: 5   External rotation at 0°: 5   Internal rotation at 0°: 5     Isolated Muscles   Biceps: 5   Triceps: 5     Right Shoulder     Planes of Motion   Flexion: 3+   Abduction: 3+   External rotation at 0°: 3+   Internal rotation at 0°: 4-     Isolated Muscles   Biceps: 4+   Triceps: 4+     Tests     Right Shoulder   Positive empty can, Hawkin's, Neer's and painful arc.   Negative drop arm, lift-off and Speed's.         See Exercise, Manual, and Modality Logs for complete treatment.       Assessment & Plan     Assessment  Impairments: abnormal or restricted ROM, impaired physical strength, lacks appropriate home exercise program and pain with function  Functional Limitations: carrying objects, lifting, uncomfortable because of pain, reaching behind back, reaching overhead and unable to perform repetitive tasks  Assessment details: Pt presents to PT post R shoulder dislocation. Pt has decrease ROM, decrease strength in R shoulder, and increase in R shoulder pain. Initiated patient's HEP focusing on improving patient's ROM and patient tolerated well. Will perform PT in order to improve patient's R shoulder ROM, strength, and decrease pain in order to return patient back to maximal functional capacity. Initiated HEP and patient tolerated well.  Prognosis: good    Goals  Plan Goals: SHOULDER PROBLEMS:    1. The patient has limited ROM of the right shoulder.    LTG 1: 12 weeks: The patient will demonstrate 170 degrees of right shoulder flexion, 170  degrees of shoulder abduction, 80 degrees of shoulder external rotation, and shoulder internal rotation to L1 to allow the patient to reach into upper kitchen cabinets and manipulate clothing behind the back with greater ease.    STG 1a: 6 weeks: The patient will demonstrate 140 degrees of right shoulder flexion, 140 degrees of shoulder abduction, and shoulder internal rotation to L5 to allow the patient to reach into upper kitchen cabinets and manipulate clothing behind the back with greater ease.      2. The patient has limited strength of the right shoulder.    LTG 2: 12 weeks: The patient will demonstrate 5/5 strength for right shoulder flexion, abduction, external rotation, and internal rotation in order to demonstrate improved shoulder stability.      STG 2a: 6 weeks: The patient will demonstrate 4+/5 strength for right shoulder flexion, abduction, external rotation, and internal rotation.      STG2b: 6 weeks: The patient will be independent with home exercises.      3. The patient complains of pain to the right shoulder.    LTG 3: 12 weeks: The patient will report a pain rating of 1/10 or better in order to improve sleep quality and tolerance to performance of activities of daily living.      STG 3a: 6 weeks: The patient will report a pain rating of 3/10 or better.      Carrying, Moving, and Handling Objects Functional Limitation    LTG 4: 12 weeks: The patient will demonstrate 0% limitation by achieving a score of 11 on the Quick DASH.    STG 4a: 6 weeks: The patient will demonstrate 1-19%% limitation by achieving a score of 12-19 on the Quick DASH.    Plan  Therapy options: will be seen for skilled therapy services  Planned modality interventions: cryotherapy, TENS and thermotherapy (hydrocollator packs)  Planned therapy interventions: body mechanics training, fine motor coordination training, flexibility, functional ROM exercises, home exercise program, joint mobilization, manual therapy, neuromuscular  re-education, postural training, soft tissue mobilization, spinal/joint mobilization, strengthening and therapeutic activities  Frequency: 2x week  Duration in weeks: 12  Treatment plan discussed with: patient        History # of Personal Factors and/or Comorbidities: LOW (0)  Examination of Body System(s): # of elements: LOW (1-2)  Clinical Presentation: STABLE   Clinical Decision Making: LOW       Timed:         Manual Therapy:    0     mins  33341;     Therapeutic Exercise:    8     mins  03833;     Neuromuscular Sheila:    0    mins  96877;    Therapeutic Activity:     0     mins  24342;     Gait Trainin     mins  76262;     Ultrasound:     0     mins  65708;    Ionto                               0    mins   81202  Self pay                         0     mins [email or web address]    Un-Timed:  Electrical Stimulation:    0     mins  23076 (MC )  Traction     0     mins 49984  Low Eval     40     Mins  31388  Mod Eval     0     Mins  28320  High Eval                       0     Mins  01844  Self Pay Eval                 0     PTSP1   Re-Eval                           0    mins  49380        Timed Treatment:   8   mins   Total Treatment:     48   mins    PT SIGNATURE: Electronically signed by Dahlia Dacosta PT  [place] LICENSE: [id number]    DATE TREATMENT INITIATED: 3/21/2022    Initial Certification  Certification Period: 3/21/2022 thru 2022  I certify that the therapy services are furnished while this patient is under my care.  The services outlined above are required by this patient, and will be reviewed every 90 days.     PHYSICIAN: Colton Anderson MD   NPI: [id number]      DATE:     Please sign and return via fax to [phone] Thank you, [facility].

## 2022-03-23 ENCOUNTER — TREATMENT (OUTPATIENT)
Dept: PHYSICAL THERAPY | Facility: CLINIC | Age: 62
End: 2022-03-23

## 2022-03-23 DIAGNOSIS — S42.291D HUMERAL HEAD FRACTURE, RIGHT, WITH ROUTINE HEALING, SUBSEQUENT ENCOUNTER: ICD-10-CM

## 2022-03-23 DIAGNOSIS — M25.511 ACUTE PAIN OF RIGHT SHOULDER: Primary | ICD-10-CM

## 2022-03-23 DIAGNOSIS — M25.60 DECREASED RANGE OF MOTION: ICD-10-CM

## 2022-03-23 DIAGNOSIS — R29.898 WEAKNESS OF SHOULDER: ICD-10-CM

## 2022-03-23 PROCEDURE — 97530 THERAPEUTIC ACTIVITIES: CPT | Performed by: PHYSICAL THERAPIST

## 2022-03-23 PROCEDURE — 97140 MANUAL THERAPY 1/> REGIONS: CPT | Performed by: PHYSICAL THERAPIST

## 2022-03-23 PROCEDURE — 97110 THERAPEUTIC EXERCISES: CPT | Performed by: PHYSICAL THERAPIST

## 2022-03-23 NOTE — PROGRESS NOTES
Physical Therapy Daily Progress Note    VISIT#: 2    Subjective   Ana Plasencia reports she has been performing HEP at home and reports her shoulder is feeling better.      Objective     See Exercise, Manual, and Modality Logs for complete treatment.     Assessment/Plan     Progressed patient's ROM exercises and patient tolerated well with improved ROM. Pt also improved with PROM of R shoulder. Will add more strengthening exercises next session to improve strength. Educated patient to continue HEP at home.      Progress per Plan of Care and Progress strengthening /stabilization /functional activity            Timed:                 Manual Therapy:    12     mins  53541;     Therapeutic Exercise:      8   mins  61157;     Neuromuscular Sheila:    0    mins  89499;    Therapeutic Activity:     10     mins  56699;     Gait Trainin     mins  08847;     Ultrasound:     0     mins  07837;    Ionto                               0    mins   38728  Self pay                         0     mins [email or web address]    Un-Timed:  Electrical Stimulation:    0     mins  33513 ( )  Canalith Repos    0     mins 45633  Dry Needling     0     mins self-pay  Traction     0     mins 53537    Timed Treatment:   30   mins   Total Treatment:     30   mins    Dahlia Dacosta PT  Physical Therapist    PT SIGNATURE: Electronically signed by aDhlia Dacosta PT  [place] LICENSE: [id number]

## 2022-03-28 ENCOUNTER — TREATMENT (OUTPATIENT)
Dept: PHYSICAL THERAPY | Facility: CLINIC | Age: 62
End: 2022-03-28

## 2022-03-28 DIAGNOSIS — R29.898 WEAKNESS OF SHOULDER: ICD-10-CM

## 2022-03-28 DIAGNOSIS — S42.291D HUMERAL HEAD FRACTURE, RIGHT, WITH ROUTINE HEALING, SUBSEQUENT ENCOUNTER: ICD-10-CM

## 2022-03-28 DIAGNOSIS — M25.511 ACUTE PAIN OF RIGHT SHOULDER: Primary | ICD-10-CM

## 2022-03-28 DIAGNOSIS — M25.60 DECREASED RANGE OF MOTION: ICD-10-CM

## 2022-03-28 PROCEDURE — 97530 THERAPEUTIC ACTIVITIES: CPT | Performed by: PHYSICAL THERAPIST

## 2022-03-28 PROCEDURE — 97140 MANUAL THERAPY 1/> REGIONS: CPT | Performed by: PHYSICAL THERAPIST

## 2022-03-28 PROCEDURE — 97112 NEUROMUSCULAR REEDUCATION: CPT | Performed by: PHYSICAL THERAPIST

## 2022-03-28 NOTE — PROGRESS NOTES
Physical Therapy Daily Progress Note    VISIT#: 3    Subjective   Ana Plasencia reports 3/10 in R shoulder today.       Objective     See Exercise, Manual, and Modality Logs for complete treatment.     Assessment/Plan    Added shoulder isometrics today and patient tolerated well with no reports of increase in pain. Pt continues to have difficulty with raising arm above head. Will continue to progress patient as able.      Progress per Plan of Care and Progress strengthening /stabilization /functional activity            Timed:                 Manual Therapy:    12     mins  72965;     Therapeutic Exercise:    0     mins  80182;     Neuromuscular Sheila:    8    mins  12262;    Therapeutic Activity:     10     mins  64079;     Gait Trainin     mins  75273;     Ultrasound:     0     mins  04472;    Ionto                               0    mins   48321  Self pay                         0     mins [email or web address]    Un-Timed:  Electrical Stimulation:    0     mins  66244 ( )  Canalith Repos    0     mins 47515  Dry Needling     0     mins self-pay  Traction     0     mins 03116    Timed Treatment:   30   mins   Total Treatment:     30   mins    Dahlia Dacosta PT  Physical Therapist    PT SIGNATURE: Electronically signed by Dahlia Dacosta PT  [place] LICENSE: [id number]

## 2022-04-04 ENCOUNTER — TREATMENT (OUTPATIENT)
Dept: PHYSICAL THERAPY | Facility: CLINIC | Age: 62
End: 2022-04-04

## 2022-04-04 DIAGNOSIS — S42.291D HUMERAL HEAD FRACTURE, RIGHT, WITH ROUTINE HEALING, SUBSEQUENT ENCOUNTER: ICD-10-CM

## 2022-04-04 DIAGNOSIS — M25.60 DECREASED RANGE OF MOTION: ICD-10-CM

## 2022-04-04 DIAGNOSIS — M25.511 ACUTE PAIN OF RIGHT SHOULDER: Primary | ICD-10-CM

## 2022-04-04 DIAGNOSIS — R29.898 WEAKNESS OF SHOULDER: ICD-10-CM

## 2022-04-04 DIAGNOSIS — E78.5 HYPERLIPIDEMIA, UNSPECIFIED HYPERLIPIDEMIA TYPE: ICD-10-CM

## 2022-04-04 PROCEDURE — 97110 THERAPEUTIC EXERCISES: CPT | Performed by: PHYSICAL THERAPIST

## 2022-04-04 PROCEDURE — 97140 MANUAL THERAPY 1/> REGIONS: CPT | Performed by: PHYSICAL THERAPIST

## 2022-04-04 RX ORDER — PRAVASTATIN SODIUM 20 MG
20 TABLET ORAL NIGHTLY
Qty: 30 TABLET | Refills: 1 | Status: SHIPPED | OUTPATIENT
Start: 2022-04-04 | End: 2022-06-20 | Stop reason: SDUPTHER

## 2022-04-04 NOTE — PROGRESS NOTES
Physical Therapy Daily Progress Note    VISIT#: 4    Subjective   Ana Plasencia reports 0/10 pain      Objective     See Exercise, Manual, and Modality Logs for complete treatment.     Assessment/Plan    Continued with passive ROM and patient continues to have difficulty with range above 90 degrees due to pain. Continued with isometric strengthening and added scapular stabilizer exercises as well today. Pt tolerated well and will continue to progress.    Progress per Plan of Care and Progress strengthening /stabilization /functional activity            Timed:                 Manual Therapy:    15     mins  17237;     Therapeutic Exercise:    10     mins  17401;     Neuromuscular Sheila:    0    mins  71629;    Therapeutic Activity:     0     mins  34638;     Gait Trainin     mins  88957;     Ultrasound:     0     mins  63617;    Ionto                               0    mins   11277  Self pay                         0     mins [email or web address]    Un-Timed:  Electrical Stimulation:    0     mins  94632 ( )  Canalith Repos    0     mins 33715  Dry Needling     0     mins self-pay  Traction     0     mins 73361    Timed Treatment:   25   mins   Total Treatment:     25   mins    Dahlia Dacosta PT  Physical Therapist    PT SIGNATURE: Electronically signed by Dahlia Dacosta PT  [place] LICENSE: [id number]

## 2022-04-06 ENCOUNTER — TREATMENT (OUTPATIENT)
Dept: PHYSICAL THERAPY | Facility: CLINIC | Age: 62
End: 2022-04-06

## 2022-04-06 DIAGNOSIS — S42.291D HUMERAL HEAD FRACTURE, RIGHT, WITH ROUTINE HEALING, SUBSEQUENT ENCOUNTER: ICD-10-CM

## 2022-04-06 DIAGNOSIS — R29.898 WEAKNESS OF SHOULDER: ICD-10-CM

## 2022-04-06 DIAGNOSIS — M25.60 DECREASED RANGE OF MOTION: ICD-10-CM

## 2022-04-06 DIAGNOSIS — M25.511 ACUTE PAIN OF RIGHT SHOULDER: Primary | ICD-10-CM

## 2022-04-06 PROCEDURE — 97110 THERAPEUTIC EXERCISES: CPT | Performed by: PHYSICAL THERAPIST

## 2022-04-06 PROCEDURE — 97140 MANUAL THERAPY 1/> REGIONS: CPT | Performed by: PHYSICAL THERAPIST

## 2022-04-06 PROCEDURE — 97530 THERAPEUTIC ACTIVITIES: CPT | Performed by: PHYSICAL THERAPIST

## 2022-04-06 NOTE — PROGRESS NOTES
Physical Therapy Daily Treatment Note      Patient: Ana Plasencia   : 1960  Referring practitioner: Colton Anderson MD  Date of Initial Visit: Type: THERAPY  Noted: 3/21/2022  Today's Date: 2022  Patient seen for 5 sessions           Subjective Questionnaire:       Subjective Evaluation    History of Present Illness    Subjective comment: Pt reports current pain is 2-3/10 7:04 am but when she woke up this morning pain was bad, frozen up.       Objective   See Exercise, Manual, and Modality Logs for complete treatment.       Assessment & Plan     Assessment    Assessment details: Pt is limited in all RUE range of motion.  Pt is limited in passive range secondary to pain.  Educated pt in and performed towel stretch.  Continue with POC.        Visit Diagnoses:    ICD-10-CM ICD-9-CM   1. Acute pain of right shoulder  M25.511 719.41   2. Humeral head fracture, right, with routine healing, subsequent encounter  S42.291D V54.11   3. Decreased range of motion  M25.60 719.50   4. Weakness of shoulder  R29.898 719.61       Progress per Plan of Care and Progress strengthening /stabilization /functional activity           Timed:  Manual Therapy:    15     mins  85344;  Therapeutic Exercise:    17    mins  61506;     Neuromuscular Sheila:        mins  16503;    Therapeutic Activity:   8       mins  99710;     Gait Training:           mins  97375;     Ultrasound:          mins  33713;    Electrical Stimulation:         mins  70584 ( );  Aquatic Therapy          mins  18691    Untimed:  Electrical Stimulation:         mins  35875 ( );  Mechanical Traction:         mins  67712;     Timed Treatment:   40   mins   Total Treatment:     40   mins    Electronically signed    Clare Tran PTA  Physical Therapist Assistant    [place] license: [id number]

## 2022-04-11 ENCOUNTER — TREATMENT (OUTPATIENT)
Dept: PHYSICAL THERAPY | Facility: CLINIC | Age: 62
End: 2022-04-11

## 2022-04-11 DIAGNOSIS — M25.511 ACUTE PAIN OF RIGHT SHOULDER: Primary | ICD-10-CM

## 2022-04-11 DIAGNOSIS — M25.60 DECREASED RANGE OF MOTION: ICD-10-CM

## 2022-04-11 DIAGNOSIS — S42.291D HUMERAL HEAD FRACTURE, RIGHT, WITH ROUTINE HEALING, SUBSEQUENT ENCOUNTER: ICD-10-CM

## 2022-04-11 DIAGNOSIS — R29.898 WEAKNESS OF SHOULDER: ICD-10-CM

## 2022-04-11 PROCEDURE — 97140 MANUAL THERAPY 1/> REGIONS: CPT | Performed by: PHYSICAL THERAPIST

## 2022-04-11 PROCEDURE — 97110 THERAPEUTIC EXERCISES: CPT | Performed by: PHYSICAL THERAPIST

## 2022-04-11 NOTE — PROGRESS NOTES
Physical Therapy Daily Progress Note    VISIT#: 6    Subjective   Ana Plasencia reports 3/10 pain today.      Objective     See Exercise, Manual, and Modality Logs for complete treatment.     Assessment/Plan       Pt tolerated exercises and manual therapy well with no reports of increase in pain. Pt continues to have significant limitations with ROM above 90 degrees. Will continue to progress patient's strengthening and ROM exercises as able.    Progress per Plan of Care and Progress strengthening /stabilization /functional activity            Timed:                 Manual Therapy:    15     mins  11984;     Therapeutic Exercise:    16     mins  07488;     Neuromuscular Sheila:    0    mins  08324;    Therapeutic Activity:     0     mins  15662;     Gait Trainin     mins  61275;     Ultrasound:     0  mins  33050;    Ionto                               0    mins   76901  Self pay                         0     mins [email or web address]    Un-Timed:  Electrical Stimulation:    0     mins  31170 ( )  Canalith Repos    0     mins 27953  Dry Needling     0     mins self-pay  Traction     0     mins 63879    Timed Treatment:   31   mins   Total Treatment:     31   mins    Dahlia Dacosta PT  Physical Therapist    PT SIGNATURE: Electronically signed by Dahlia Dacosta PT  [place] LICENSE: [id number]

## 2022-04-13 ENCOUNTER — TREATMENT (OUTPATIENT)
Dept: PHYSICAL THERAPY | Facility: CLINIC | Age: 62
End: 2022-04-13

## 2022-04-13 DIAGNOSIS — S42.291D HUMERAL HEAD FRACTURE, RIGHT, WITH ROUTINE HEALING, SUBSEQUENT ENCOUNTER: ICD-10-CM

## 2022-04-13 DIAGNOSIS — M25.511 ACUTE PAIN OF RIGHT SHOULDER: Primary | ICD-10-CM

## 2022-04-13 DIAGNOSIS — M25.60 DECREASED RANGE OF MOTION: ICD-10-CM

## 2022-04-13 DIAGNOSIS — R29.898 WEAKNESS OF SHOULDER: ICD-10-CM

## 2022-04-13 PROCEDURE — 97110 THERAPEUTIC EXERCISES: CPT | Performed by: PHYSICAL THERAPIST

## 2022-04-13 PROCEDURE — 97140 MANUAL THERAPY 1/> REGIONS: CPT | Performed by: PHYSICAL THERAPIST

## 2022-04-13 NOTE — PROGRESS NOTES
Physical Therapy Daily Treatment Note      Patient: Ana Plasencia   : 1960  Referring practitioner: Colton Anderson MD  Date of Initial Visit: Type: THERAPY  Noted: 3/21/2022  Today's Date: 2022  Patient seen for 7 sessions           Subjective Questionnaire:       Subjective Evaluation    History of Present Illness    Subjective comment: Pt reports R shoulder is stiff and painful 6/10 first thing in the morning and currently at 2-3/10 7:00 am.       Objective   See Exercise, Manual, and Modality Logs for complete treatment.       Assessment & Plan     Assessment    Assessment details: Pt's  R shoulder ABD improved with manual however is still not to 90 degrees.  Pt tolerates tx well and was encouraged to perform wall slides for RUE range of motion.          Visit Diagnoses:    ICD-10-CM ICD-9-CM   1. Acute pain of right shoulder  M25.511 719.41   2. Humeral head fracture, right, with routine healing, subsequent encounter  S42.291D V54.11   3. Decreased range of motion  M25.60 719.50   4. Weakness of shoulder  R29.898 719.61       Progress per Plan of Care and Progress strengthening /stabilization /functional activity           Timed:  Manual Therapy:    8     mins  61005;  Therapeutic Exercise:    22     mins  67153;     Neuromuscular Sheila:        mins  45227;    Therapeutic Activity:          mins  90426;     Gait Training:           mins  74576;     Ultrasound:          mins  40940;    Electrical Stimulation:         mins  79444 ( );  Aquatic Therapy          mins  57327    Untimed:  Electrical Stimulation:         mins  89805 ( );  Mechanical Traction:         mins  20063;     Timed Treatment:   30   mins   Total Treatment:     30   mins    Electronically signed    Clare Tran PTA  Physical Therapist Assistant    [place] license: [id number]

## 2022-04-18 ENCOUNTER — TREATMENT (OUTPATIENT)
Dept: PHYSICAL THERAPY | Facility: CLINIC | Age: 62
End: 2022-04-18

## 2022-04-18 DIAGNOSIS — R29.898 WEAKNESS OF SHOULDER: ICD-10-CM

## 2022-04-18 DIAGNOSIS — M25.511 ACUTE PAIN OF RIGHT SHOULDER: Primary | ICD-10-CM

## 2022-04-18 DIAGNOSIS — S42.291D HUMERAL HEAD FRACTURE, RIGHT, WITH ROUTINE HEALING, SUBSEQUENT ENCOUNTER: ICD-10-CM

## 2022-04-18 DIAGNOSIS — M25.60 DECREASED RANGE OF MOTION: ICD-10-CM

## 2022-04-18 PROCEDURE — 97110 THERAPEUTIC EXERCISES: CPT | Performed by: PHYSICAL THERAPIST

## 2022-04-18 PROCEDURE — 97140 MANUAL THERAPY 1/> REGIONS: CPT | Performed by: PHYSICAL THERAPIST

## 2022-04-18 NOTE — PROGRESS NOTES
"Physical Therapy Daily Treatment Note      Patient: Ana Plasencia   : 1960  Referring practitioner: Colton Anderson MD  Date of Initial Visit: Type: THERAPY  Noted: 3/21/2022  Today's Date: 2022  Patient seen for 8 sessions           Subjective Questionnaire:       Subjective Evaluation    History of Present Illness    Subjective comment: Pt reports shoulder is good today and feels it is getting better.  When asked specificaaly how reaching is pt replied \"I've been trying.\"       Objective   See Exercise, Manual, and Modality Logs for complete treatment.       Assessment & Plan     Assessment    Assessment details: Pt lacks RUE range of motion in al planes with behind the back most limited and progressing.  Pt reports pain with passive stretching.          Visit Diagnoses:    ICD-10-CM ICD-9-CM   1. Acute pain of right shoulder  M25.511 719.41   2. Humeral head fracture, right, with routine healing, subsequent encounter  S42.291D V54.11   3. Decreased range of motion  M25.60 719.50   4. Weakness of shoulder  R29.898 719.61       Progress per Plan of Care and Progress strengthening /stabilization /functional activity           Timed:  Manual Therapy:    8     mins  81425;  Therapeutic Exercise:    21     mins  32197;     Neuromuscular Sheila:        mins  93628;    Therapeutic Activity:          mins  27174;     Gait Training:           mins  63484;     Ultrasound:          mins  11101;    Electrical Stimulation:         mins  47854 ( );  Aquatic Therapy          mins  86140    Untimed:  Electrical Stimulation:         mins  22347 ( );  Mechanical Traction:         mins  50914;     Timed Treatment:   29   mins   Total Treatment:     29   mins    Electronically signed    Clare Tran PTA  Physical Therapist Assistant    [employer] license: [id number]    "

## 2022-04-20 ENCOUNTER — TREATMENT (OUTPATIENT)
Dept: PHYSICAL THERAPY | Facility: CLINIC | Age: 62
End: 2022-04-20

## 2022-04-20 DIAGNOSIS — M25.60 DECREASED RANGE OF MOTION: ICD-10-CM

## 2022-04-20 DIAGNOSIS — S42.291D HUMERAL HEAD FRACTURE, RIGHT, WITH ROUTINE HEALING, SUBSEQUENT ENCOUNTER: ICD-10-CM

## 2022-04-20 DIAGNOSIS — M25.511 ACUTE PAIN OF RIGHT SHOULDER: Primary | ICD-10-CM

## 2022-04-20 DIAGNOSIS — R29.898 WEAKNESS OF SHOULDER: ICD-10-CM

## 2022-04-20 PROCEDURE — 97140 MANUAL THERAPY 1/> REGIONS: CPT | Performed by: PHYSICAL THERAPIST

## 2022-04-20 NOTE — PROGRESS NOTES
Progress Assessment        Patient: Ana Plasencia   : 1960  Diagnosis/ICD-10 Code:  Acute pain of right shoulder [M25.511]  Referring practitioner: Colton Anderson MD  Date of Initial Visit: Type: THERAPY  Noted: 3/21/2022  Today's Date: 2022  Patient seen for 9 sessions      Subjective:   Ana Plasencia reports: 3/10 pain in R shoulder   Subjective Questionnaire: QuickDASH: 26/55 - 20-39%  Clinical Progress: improved  Home Program Compliance: Yes  Treatment has included: therapeutic exercise, neuromuscular re-education, manual therapy and therapeutic activity    Subjective   Objective     R shoulder active flexion: 108 degrees  R shoulder active abduction: 90 degrees  R shoulder IR BTB: R hip  R shoulder ER: C1    R shoulder passive flexion: 150 degrees  R shoulder passive abduction: 140 degrees  R shoulder passive IR at 45 degrees: 60 degrees  R shoulder passive ER at 45 degrees: 30 degrees    R shoulder flexion: 3-/5  R shoulder abduction: 3-/5  R shoulder IR: 4+/5  R shoulder ER: 4/5  R elbow flexion and extension: 5/5      Assessment/Plan     Pt has made some progress with improving active shoulder ROM but continues to be limited and not in a functional range. Pt also continues to be limited with strength in R shoulder as well with the most limitation with shoulder flexion and abduction. Will continue PT to address patient's deficits. Pt follows up with orthopedics tomorrow.     1. The patient has limited ROM of the right shoulder.    LTG 1: 12 weeks: The patient will demonstrate 170 degrees of right shoulder flexion, 170 degrees of shoulder abduction, 80 degrees of shoulder external rotation, and shoulder internal rotation to L1 to allow the patient to reach into upper kitchen cabinets and manipulate clothing behind the back with greater ease.    IN PROGRESS    STG 1a: 6 weeks: The patient will demonstrate 140 degrees of right shoulder flexion, 140 degrees of shoulder abduction, and shoulder  internal rotation to L5 to allow the patient to reach into upper kitchen cabinets and manipulate clothing behind the back with greater ease.    IN PROGRESS    2. The patient has limited strength of the right shoulder.    LTG 2: 12 weeks: The patient will demonstrate 5/5 strength for right shoulder flexion, abduction, external rotation, and internal rotation in order to demonstrate improved shoulder stability.    IN PROGRESS    STG 2a: 6 weeks: The patient will demonstrate 4+/5 strength for right shoulder flexion, abduction, external rotation, and internal rotation.    IN PROGRESS      STG2b: 6 weeks: The patient will be independent with home exercises.    IN PROGRESS    3. The patient complains of pain to the right shoulder.      LTG 3: 12 weeks: The patient will report a pain rating of 1/10 or better in order to improve sleep quality and tolerance to performance of activities of daily living.    IN PROGRESS    STG 3a: 6 weeks: The patient will report a pain rating of 3/10 or better.    MET    Carrying, Moving, and Handling Objects Functional Limitation    LTG 4: 12 weeks: The patient will demonstrate 0% limitation by achieving a score of 11 on the Quick DASH.    IN PROGRESS    STG 4a: 6 weeks: The patient will demonstrate 1-19%% limitation by achieving a score of 12-19 on the Quick DASH.    IN PROGRESS    Progress toward previous goals: Partially Met    See Exercise, Manual, and Modality Logs for complete treatment.         Recommendations: Continue as planned  Timeframe:2x a week 6 weeks  Prognosis to achieve goals: good    PT SIGNATURE: Electronically signed by Dahlia Dacosta, PT  [place] LICENSE: [id number]    Based upon review of the patient's progress and continued therapy plan, it is my medical opinion that Ana Plasencia should continue physical therapy treatment at [facility] PH[facility]  [address] [facility] [place] [address] [phone].      Timed:                 Manual  Therapy:    20     mins  31059;     Therapeutic Exercise:    4     mins  31982;     Neuromuscular Sheila:    0    mins  87748;    Therapeutic Activity:     0     mins  98756;     Gait Trainin     mins  82182;     Ultrasound:     0     mins  68759;    Ionto                               0    mins   66512  Self pay                         0     mins [email or web address]    Un-Timed:  Electrical Stimulation:    0     mins  58819 ( )  Canalith Repos    0     mins 10356  Dry Needling     0     mins self-pay  Traction     0     mins 59203    Timed Treatment:   24   mins   Total Treatment:     30   mins      I certify that the therapy services are furnished while this patient is under my care.  The services outlined above are required by this patient, and will be reviewed every 90 days.

## 2022-04-21 ENCOUNTER — OFFICE VISIT (OUTPATIENT)
Dept: ORTHOPEDIC SURGERY | Facility: CLINIC | Age: 62
End: 2022-04-21

## 2022-04-21 VITALS — HEART RATE: 66 BPM | BODY MASS INDEX: 36.54 KG/M2 | OXYGEN SATURATION: 97 % | HEIGHT: 64 IN | WEIGHT: 214 LBS

## 2022-04-21 DIAGNOSIS — S46.011D TRAUMATIC INCOMPLETE TEAR OF RIGHT ROTATOR CUFF, SUBSEQUENT ENCOUNTER: Primary | ICD-10-CM

## 2022-04-21 DIAGNOSIS — S42.291A HUMERAL HEAD FRACTURE, RIGHT, CLOSED, INITIAL ENCOUNTER: ICD-10-CM

## 2022-04-21 DIAGNOSIS — S42.211D CLOSED FRACTURE OF NECK OF RIGHT HUMERUS WITH ROUTINE HEALING, SUBSEQUENT ENCOUNTER: ICD-10-CM

## 2022-04-21 DIAGNOSIS — S43.004A SHOULDER DISLOCATION, RIGHT, INITIAL ENCOUNTER: ICD-10-CM

## 2022-04-21 PROBLEM — S42.213D: Status: ACTIVE | Noted: 2022-02-17

## 2022-04-21 PROCEDURE — 99213 OFFICE O/P EST LOW 20 MIN: CPT | Performed by: PHYSICIAN ASSISTANT

## 2022-04-21 RX ORDER — BIMATOPROST 0.3 MG/ML
SOLUTION/ DROPS OPHTHALMIC
COMMUNITY
Start: 2022-03-23 | End: 2022-06-20 | Stop reason: SDUPTHER

## 2022-04-21 NOTE — PROGRESS NOTES
"Chief Complaint  Pain of the Right Shoulder    Subjective          Ana Plasencia is a 62 y.o. female  presents to [facility] for   History of Present Illness    Patient presents for follow-up evaluation right shoulder pain, right shoulder dislocation.  Original injury was 1/22/2022.  Patient has been treating her shoulder conservatively with physical therapy.  Patient states that she has been attending therapy since March 21 she states she is making progress with range of motion she states she still has some pain and stiffness with waking up in the morning as well as with flexion type movements.  Patient still would like to avoid surgery she states that she would like to continue physical therapy she feels like she can continue progress.  She denies new injury or symptoms of pain, states pain is controlled denies need for pain medication/NSAIDs.  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  She is happy with her current progress.      .No Known Allergies     Social History     Socioeconomic History   • Marital status:    Tobacco Use   • Smoking status: Never Smoker   • Smokeless tobacco: Never Used   Vaping Use   • Vaping Use: Never used   Substance and Sexual Activity   • Alcohol use: Never   • Drug use: Never        REVIEW OF SYSTEMS    Constitutional: Denies fevers, chills, weight loss  Cardiovascular: Denies chest pain, shortness of breath  Skin: Denies rashes, acute skin changes  Neurologic: Denies headache, loss of consciousness  MSK: Right shoulder pain      Objective   Vital Signs:   Pulse 66   Ht 162.6 cm (64\")   Wt 97.1 kg (214 lb)   SpO2 97%   BMI 36.73 kg/m²     Body mass index is 36.73 kg/m².    Physical Exam    Right shoulder: Nontender to palpation, no pain with range of motion, active forward elevation 100, active abduction 90, external " rotation with abduction 70, internal rotation to her side some, sensation intact light touch, 4+ supraspinatus, 5 of 5 infraspinatus and subscapularis.    Procedures    Imaging Results (Most Recent)     Procedure Component Value Units Date/Time    XR Scapula Right [[id number]] Resulted: 04/21/22 1629     Updated: 04/21/22 1630    Narrative:      • View:AP and Lateral view(s)  • Site: Right shoulder  • Indication: Right shoulder pain  • Study: X-rays ordered, taken in the office, and reviewed today  • X-ray: Well-healed proximal humerus fracture, no increased displacement   or angulation  • Comparative data: No comparative data found             Result Review :   The following data was reviewed by: ISAIAH Nelson on 04/21/2022:  Data reviewed: Radiologic studies Reviewed by me with the patient             Assessment and Plan    Diagnoses and all orders for this visit:    1. Traumatic incomplete tear of right rotator cuff, subsequent encounter (Primary)  -     XR Scapula Right  -     Ambulatory Referral to Physical Therapy Evaluate and treat (2-3X/WEEK FOR 6-8 WEEKS), Ortho    2. Closed fracture of neck of right humerus with routine healing, subsequent encounter  -     XR Scapula Right  -     Ambulatory Referral to Physical Therapy Evaluate and treat (2-3X/WEEK FOR 6-8 WEEKS), Ortho    3. Humeral head fracture, right, closed, subsequent encounter  -     XR Scapula Right  -     Ambulatory Referral to Physical Therapy Evaluate and treat (2-3X/WEEK FOR 6-8 WEEKS), Ortho    4. Shoulder dislocation, right, subsequent encounter  -     XR Scapula Right  -     Ambulatory Referral to Physical Therapy Evaluate and treat (2-3X/WEEK FOR 6-8 WEEKS), Ortho    5. Humeral head fracture, right, closed, initial encounter  -     XR Scapula Right  -     Ambulatory Referral to Physical Therapy Evaluate and treat (2-3X/WEEK FOR 6-8 WEEKS), Ortho    6. Shoulder dislocation, right, initial encounter  -     XR Scapula Right  -      Ambulatory Referral to Physical Therapy Evaluate and treat (2-3X/WEEK FOR 6-8 WEEKS), Ortho        Reviewed x-ray with the patient, discussed diagnosis and treatment options, patient would like to continue conservative treatment she was given new order to continue physical therapy if worsening symptoms occur she was advised to follow-up sooner otherwise follow-up in 6 weeks for recheck, patient agreed with plan    Call or return if worsening symptoms.    Follow Up   Return in about 6 weeks (around 6/2/2022) for Recheck.  Patient was given instructions and counseling regarding her condition or for health maintenance advice. Please see specific information pulled into the AVS if appropriate.

## 2022-04-25 ENCOUNTER — TREATMENT (OUTPATIENT)
Dept: PHYSICAL THERAPY | Facility: CLINIC | Age: 62
End: 2022-04-25

## 2022-04-25 DIAGNOSIS — M25.60 DECREASED RANGE OF MOTION: ICD-10-CM

## 2022-04-25 DIAGNOSIS — M25.511 ACUTE PAIN OF RIGHT SHOULDER: Primary | ICD-10-CM

## 2022-04-25 DIAGNOSIS — R29.898 WEAKNESS OF SHOULDER: ICD-10-CM

## 2022-04-25 DIAGNOSIS — S42.291D HUMERAL HEAD FRACTURE, RIGHT, WITH ROUTINE HEALING, SUBSEQUENT ENCOUNTER: ICD-10-CM

## 2022-04-25 PROCEDURE — 97112 NEUROMUSCULAR REEDUCATION: CPT | Performed by: PHYSICAL THERAPIST

## 2022-04-25 PROCEDURE — 97110 THERAPEUTIC EXERCISES: CPT | Performed by: PHYSICAL THERAPIST

## 2022-04-25 NOTE — PROGRESS NOTES
Physical Therapy Daily Progress Note    VISIT#: 10    Subjective   Ana Plasencia reports 2/10 pain today. Pt had follow up with ortho and ortho wants patient to continue PT and follow-up in 6 weeks.       Objective     See Exercise, Manual, and Modality Logs for complete treatment.     Assessment/Plan     Pt was late to PT today due to having sick dogs this morning, therefore focused therapy session on strengthening exercises and patient tolerated well. Increased external resistance and reps with exercises and patient tolerated well. Will continue to progress patient as able.      Progress per Plan of Care and Progress strengthening /stabilization /functional activity            Timed:                 Manual Therapy:    0     mins  17954;     Therapeutic Exercise:    12     mins  10113;     Neuromuscular Sheila:    8    mins  48889;    Therapeutic Activity:     0     mins  76027;     Gait Trainin     mins  79698;     Ultrasound:     0     mins  47259;    Ionto                               0    mins   31958  Self pay                         0     mins [email or web address]    Un-Timed:  Electrical Stimulation:    0     mins  06451 ( )  Canalith Repos    0     mins 66821  Dry Needling     0     mins self-pay  Traction     0     mins 38717    Timed Treatment:   20   mins   Total Treatment:     20   mins    Dahlia Dacosta PT  Physical Therapist    PT SIGNATURE: Electronically signed by Dahlia Dacosta PT  [place] LICENSE: [id number]

## 2022-05-02 ENCOUNTER — TREATMENT (OUTPATIENT)
Dept: PHYSICAL THERAPY | Facility: CLINIC | Age: 62
End: 2022-05-02

## 2022-05-02 DIAGNOSIS — R29.898 WEAKNESS OF SHOULDER: ICD-10-CM

## 2022-05-02 DIAGNOSIS — M25.60 DECREASED RANGE OF MOTION: ICD-10-CM

## 2022-05-02 DIAGNOSIS — M25.511 ACUTE PAIN OF RIGHT SHOULDER: Primary | ICD-10-CM

## 2022-05-02 DIAGNOSIS — S42.291D HUMERAL HEAD FRACTURE, RIGHT, WITH ROUTINE HEALING, SUBSEQUENT ENCOUNTER: ICD-10-CM

## 2022-05-02 PROCEDURE — 97530 THERAPEUTIC ACTIVITIES: CPT | Performed by: PHYSICAL THERAPIST

## 2022-05-02 PROCEDURE — 97140 MANUAL THERAPY 1/> REGIONS: CPT | Performed by: PHYSICAL THERAPIST

## 2022-05-02 PROCEDURE — 97110 THERAPEUTIC EXERCISES: CPT | Performed by: PHYSICAL THERAPIST

## 2022-05-02 NOTE — PROGRESS NOTES
Physical Therapy Daily Progress Note    VISIT#: 11    Subjective   Ana Plasencia reports no new complaints today.      Objective     See Exercise, Manual, and Modality Logs for complete treatment.     Assessment/Plan     Progressed strengthening exercises today and patient tolerated but was challenged due to weakness. Pt continues to compensate with upper trap with shoulder flexion due to weakness in shoulder. Pt has full passive shoulder flexion and abduction, limited with passive IR and ER.       Progress per Plan of Care and Progress strengthening /stabilization /functional activity            Timed:                 Manual Therapy:    10     mins  72211;     Therapeutic Exercise:    14     mins  96774;     Neuromuscular Sheila:    0    mins  36287;    Therapeutic Activity:     8     mins  66353;     Gait Trainin     mins  95970;     Ultrasound:     0     mins  28015;    Ionto                               0    mins   58141  Self pay                         0     mins [email or web address]    Un-Timed:  Electrical Stimulation:    0     mins  68177 ( )  Canalith Repos    0     mins 08521  Dry Needling     0     mins self-pay  Traction     0     mins 33741    Timed Treatment:   32   mins   Total Treatment:     32   mins    Dahlia Dacosta PT  Physical Therapist    PT SIGNATURE: Electronically signed by Dahlia Dacosta PT  [place] LICENSE: [id number]

## 2022-05-04 ENCOUNTER — TREATMENT (OUTPATIENT)
Dept: PHYSICAL THERAPY | Facility: CLINIC | Age: 62
End: 2022-05-04

## 2022-05-04 DIAGNOSIS — M25.511 ACUTE PAIN OF RIGHT SHOULDER: Primary | ICD-10-CM

## 2022-05-04 DIAGNOSIS — S42.291D HUMERAL HEAD FRACTURE, RIGHT, WITH ROUTINE HEALING, SUBSEQUENT ENCOUNTER: ICD-10-CM

## 2022-05-04 DIAGNOSIS — M25.60 DECREASED RANGE OF MOTION: ICD-10-CM

## 2022-05-04 DIAGNOSIS — R29.898 WEAKNESS OF SHOULDER: ICD-10-CM

## 2022-05-04 PROCEDURE — 97140 MANUAL THERAPY 1/> REGIONS: CPT | Performed by: PHYSICAL THERAPIST

## 2022-05-04 PROCEDURE — 97110 THERAPEUTIC EXERCISES: CPT | Performed by: PHYSICAL THERAPIST

## 2022-05-04 PROCEDURE — 97530 THERAPEUTIC ACTIVITIES: CPT | Performed by: PHYSICAL THERAPIST

## 2022-05-04 NOTE — PROGRESS NOTES
Physical Therapy Daily Treatment Note      Patient: Ana Plasencia   : 1960  Referring practitioner: Colton Anderson MD  Date of Initial Visit: Type: THERAPY  Noted: 3/21/2022  Today's Date: 2022  Patient seen for 12 sessions           Subjective Questionnaire:       Subjective Evaluation    History of Present Illness    Subjective comment: Pt reported pain level is good today probably a 0/10.  Pt was here today after working with [employer] all day.       Objective   See Exercise, Manual, and Modality Logs for complete treatment.       Assessment & Plan     Assessment    Assessment details: Pt tolerated tx well today and is progressing well.  Continue with POC.        Visit Diagnoses:    ICD-10-CM ICD-9-CM   1. Acute pain of right shoulder  M25.511 719.41   2. Humeral head fracture, right, with routine healing, subsequent encounter  S42.291D V54.11   3. Decreased range of motion  M25.60 719.50   4. Weakness of shoulder  R29.898 719.61       Progress per Plan of Care and Progress strengthening /stabilization /functional activity           Timed:  Manual Therapy:    10     mins  71516;  Therapeutic Exercise:    10     mins  43624;     Neuromuscular Sheila:        mins  50190;    Therapeutic Activity:     8     mins  97657;     Gait Training:           mins  98816;     Ultrasound:          mins  74705;    Electrical Stimulation:         mins  54281 ( );  Aquatic Therapy          mins  18658    Untimed:  Electrical Stimulation:         mins  31025 ( );  Mechanical Traction:         mins  66972;     Timed Treatment:   28   mins   Total Treatment:     28   mins    Electronically signed    Clare Tran PTA  Physical Therapist Assistant    [place] license: [id number]

## 2022-05-09 ENCOUNTER — TREATMENT (OUTPATIENT)
Dept: PHYSICAL THERAPY | Facility: CLINIC | Age: 62
End: 2022-05-09

## 2022-05-09 DIAGNOSIS — S42.291D HUMERAL HEAD FRACTURE, RIGHT, WITH ROUTINE HEALING, SUBSEQUENT ENCOUNTER: ICD-10-CM

## 2022-05-09 DIAGNOSIS — R29.898 WEAKNESS OF SHOULDER: ICD-10-CM

## 2022-05-09 DIAGNOSIS — M25.511 ACUTE PAIN OF RIGHT SHOULDER: Primary | ICD-10-CM

## 2022-05-09 DIAGNOSIS — M25.60 DECREASED RANGE OF MOTION: ICD-10-CM

## 2022-05-09 PROCEDURE — 97140 MANUAL THERAPY 1/> REGIONS: CPT | Performed by: PHYSICAL THERAPIST

## 2022-05-09 PROCEDURE — 97110 THERAPEUTIC EXERCISES: CPT | Performed by: PHYSICAL THERAPIST

## 2022-05-09 PROCEDURE — 97530 THERAPEUTIC ACTIVITIES: CPT | Performed by: PHYSICAL THERAPIST

## 2022-05-09 NOTE — PROGRESS NOTES
Physical Therapy Daily Progress Note    VISIT#: 13    Subjective   Ana Plasencia reports 2/10.      Objective     See Exercise, Manual, and Modality Logs for complete treatment.     Assessment/Plan     Pt continues to make excellent progress with improving shoulder PROM, but is limited with active ROM. Will continue to progress patient's strengthening and ROM exercises as able.      Progress per Plan of Care and Progress strengthening /stabilization /functional activity            Timed:                 Manual Therapy:    10     mins  73472;     Therapeutic Exercise:    13     mins  50974;     Neuromuscular Sheila:    0    mins  88539;    Therapeutic Activity:     8     mins  14350;     Gait Trainin     mins  73074;     Ultrasound:     0     mins  66981;    Ionto                               0    mins   95225  Self pay                         0     mins [email or web address]    Un-Timed:  Electrical Stimulation:    0     mins  77212 ( )  Canalith Repos    0     mins 05054  Dry Needling     0     mins self-pay  Traction     0     mins 31351    Timed Treatment:   31   mins   Total Treatment:     31   mins    Dahlia Dacosta PT  Physical Therapist    PT SIGNATURE: Electronically signed by Dahlia Dacosta PT  [place] LICENSE: [id number]

## 2022-05-11 ENCOUNTER — TREATMENT (OUTPATIENT)
Dept: PHYSICAL THERAPY | Facility: CLINIC | Age: 62
End: 2022-05-11

## 2022-05-11 DIAGNOSIS — R29.898 WEAKNESS OF SHOULDER: ICD-10-CM

## 2022-05-11 DIAGNOSIS — M25.60 DECREASED RANGE OF MOTION: ICD-10-CM

## 2022-05-11 DIAGNOSIS — M25.511 ACUTE PAIN OF RIGHT SHOULDER: Primary | ICD-10-CM

## 2022-05-11 DIAGNOSIS — S42.291D HUMERAL HEAD FRACTURE, RIGHT, WITH ROUTINE HEALING, SUBSEQUENT ENCOUNTER: ICD-10-CM

## 2022-05-11 PROCEDURE — 97530 THERAPEUTIC ACTIVITIES: CPT | Performed by: PHYSICAL THERAPIST

## 2022-05-11 PROCEDURE — 97110 THERAPEUTIC EXERCISES: CPT | Performed by: PHYSICAL THERAPIST

## 2022-05-11 PROCEDURE — 97140 MANUAL THERAPY 1/> REGIONS: CPT | Performed by: PHYSICAL THERAPIST

## 2022-05-11 NOTE — PROGRESS NOTES
Physical Therapy Daily Treatment Note      Patient: Ana Plasencia   : 1960  Referring practitioner: Colton Anderson MD  Date of Initial Visit: Type: THERAPY  Noted: 3/21/2022  Today's Date: 2022  Patient seen for 14 sessions           Subjective Questionnaire:       Subjective Evaluation    History of Present Illness    Subjective comment: Pt reported her shoulder was sore all day yesterday and last night.       Objective   See Exercise, Manual, and Modality Logs for complete treatment.       Assessment & Plan     Assessment    Assessment details: Pt's R shoulder motion remains limited and flexion/ABD improved with pullys.  Pt tolerated x well.  Pt is mostly limited in internal and external rotation.        Visit Diagnoses:    ICD-10-CM ICD-9-CM   1. Acute pain of right shoulder  M25.511 719.41   2. Humeral head fracture, right, with routine healing, subsequent encounter  S42.291D V54.11   3. Decreased range of motion  M25.60 719.50   4. Weakness of shoulder  R29.898 719.61       Progress per Plan of Care and Progress strengthening /stabilization /functional activity           Timed:  Manual Therapy:    12     mins  10440;  Therapeutic Exercise:    10     mins  07294;     Neuromuscular Sheila:        mins  68804;    Therapeutic Activity:    8      mins  36623;     Gait Training:           mins  28002;     Ultrasound:          mins  60643;    Electrical Stimulation:         mins  02653 ( );  Aquatic Therapy          mins  28686    Untimed:  Electrical Stimulation:         mins  88892 ( );  Mechanical Traction:         mins  39105;     Timed Treatment:   30   mins   Total Treatment:     30    mins    Electronically signed    Clare Tran PTA  Physical Therapist Assistant    [place] license: [id number]

## 2022-05-18 ENCOUNTER — TREATMENT (OUTPATIENT)
Dept: PHYSICAL THERAPY | Facility: CLINIC | Age: 62
End: 2022-05-18

## 2022-05-18 DIAGNOSIS — M25.60 DECREASED RANGE OF MOTION: ICD-10-CM

## 2022-05-18 DIAGNOSIS — R29.898 WEAKNESS OF SHOULDER: ICD-10-CM

## 2022-05-18 DIAGNOSIS — S42.291D HUMERAL HEAD FRACTURE, RIGHT, WITH ROUTINE HEALING, SUBSEQUENT ENCOUNTER: ICD-10-CM

## 2022-05-18 DIAGNOSIS — M25.511 ACUTE PAIN OF RIGHT SHOULDER: Primary | ICD-10-CM

## 2022-05-18 PROCEDURE — 97110 THERAPEUTIC EXERCISES: CPT | Performed by: PHYSICAL THERAPIST

## 2022-05-18 PROCEDURE — 97140 MANUAL THERAPY 1/> REGIONS: CPT | Performed by: PHYSICAL THERAPIST

## 2022-05-18 NOTE — PROGRESS NOTES
Progress Assessment        Patient: Ana Plasencia   : 1960  Diagnosis/ICD-10 Code:  Acute pain of right shoulder [M25.511]  Referring practitioner: Colton Anderson MD  Date of Initial Visit: Type: THERAPY  Noted: 3/21/2022  Today's Date: 2022  Patient seen for 15 sessions      Subjective:   Ana Plasencia reports: 0/10  Subjective Questionnaire: QuickDASH: 21/55 - 20-39%  Clinical Progress: improved  Home Program Compliance: Yes  Treatment has included: therapeutic exercise, neuromuscular re-education, manual therapy and therapeutic activity    Subjective Pt reports it is getting easier to put her arm behind her head.    Objective     R shoulder active flexion: 130 degrees  R shoulder active abduction: 115 degrees  R shoulder IR BTB: R hip  R shoulder ER: C1     R shoulder passive flexion: 160 degrees  R shoulder passive abduction: 150 degrees  R shoulder passive IR at 45 degrees: 60 degrees  R shoulder passive ER at 45 degrees: 30 degrees     R shoulder flexion: 3+/5  R shoulder abduction: 3+/5  R shoulder IR: 4+/5  R shoulder ER: 4+/5  R elbow flexion and extension: 5/5    Assessment/Plan     Pt has made progress with improving R shoulder AROM and strength but continues to be limited with both. Will continue with PT until patient follows up with MD in  in order to progress patient's strength and ROM to return patient back to maximum function.    1. The patient has limited ROM of the right shoulder.    LTG 1: 12 weeks: The patient will demonstrate 170 degrees of right shoulder flexion, 170 degrees of shoulder abduction, 80 degrees of shoulder external rotation, and shoulder internal rotation to L1 to allow the patient to reach into upper kitchen cabinets and manipulate clothing behind the back with greater ease.     IN PROGRESS    STG 1a: 6 weeks: The patient will demonstrate 140 degrees of right shoulder flexion, 140 degrees of shoulder abduction, and shoulder internal rotation to L5 to allow  the patient to reach into upper kitchen cabinets and manipulate clothing behind the back with greater ease.    IN PROGRESS    2. The patient has limited strength of the right shoulder.    LTG 2: 12 weeks: The patient will demonstrate 5/5 strength for right shoulder flexion, abduction, external rotation, and internal rotation in order to demonstrate improved shoulder stability.     IN PROGRESS    STG 2a: 6 weeks: The patient will demonstrate 4+/5 strength for right shoulder flexion, abduction, external rotation, and internal rotation.     IN PROGRESS      STG2b: 6 weeks: The patient will be independent with home exercises.     IN PROGRESS    3. The patient complains of pain to the right shoulder.      LTG 3: 12 weeks: The patient will report a pain rating of 1/10 or better in order to improve sleep quality and tolerance to performance of activities of daily living.     IN PROGRESS    STG 3a: 6 weeks: The patient will report a pain rating of 3/10 or better.     MET    Carrying, Moving, and Handling Objects Functional Limitation    LTG 4: 12 weeks: The patient will demonstrate 0% limitation by achieving a score of 11 on the Quick DASH.     IN PROGRESS    STG 4a: 6 weeks: The patient will demonstrate 1-19%% limitation by achieving a score of 12-19 on the Quick DASH.     IN PROGRESS    Progress toward previous goals: Partially Met    See Exercise, Manual, and Modality Logs for complete treatment.         Recommendations: Continue as planned  Timeframe:2x a week for 1 month  Prognosis to achieve goals: good    PT SIGNATURE: Electronically signed by Dahlia Dacosta, ROWAN  [place] LICENSE: [id number]    Based upon review of the patient's progress and continued therapy plan, it is my medical opinion that Ana Plasencia should continue physical therapy treatment at [facility]  [address] [place] [address] [phone].      Timed:                 Manual Therapy:    10     mins   99223;     Therapeutic Exercise:    21     mins  81902;     Neuromuscular Sheila:    0    mins  87259;    Therapeutic Activity:     0     mins  60253;     Gait Trainin     mins  95378;     Ultrasound:     0     mins  83886;    Ionto                               0    mins   13136  Self pay                         0     mins [email or web address]    Un-Timed:  Electrical Stimulation:    0     mins  53914 ( )  Canalith Repos    0     mins 66777  Dry Needling     0     mins self-pay  Traction     0     mins 64295    Timed Treatment:   31   mins   Total Treatment:     31   mins      I certify that the therapy services are furnished while this patient is under my care.  The services outlined above are required by this patient, and will be reviewed every 90 days.

## 2022-05-23 ENCOUNTER — TREATMENT (OUTPATIENT)
Dept: PHYSICAL THERAPY | Facility: CLINIC | Age: 62
End: 2022-05-23

## 2022-05-23 DIAGNOSIS — S42.291D HUMERAL HEAD FRACTURE, RIGHT, WITH ROUTINE HEALING, SUBSEQUENT ENCOUNTER: ICD-10-CM

## 2022-05-23 DIAGNOSIS — R29.898 WEAKNESS OF SHOULDER: ICD-10-CM

## 2022-05-23 DIAGNOSIS — M25.60 DECREASED RANGE OF MOTION: ICD-10-CM

## 2022-05-23 DIAGNOSIS — M25.511 ACUTE PAIN OF RIGHT SHOULDER: Primary | ICD-10-CM

## 2022-05-23 PROCEDURE — 97110 THERAPEUTIC EXERCISES: CPT | Performed by: PHYSICAL THERAPIST

## 2022-05-23 PROCEDURE — 97530 THERAPEUTIC ACTIVITIES: CPT | Performed by: PHYSICAL THERAPIST

## 2022-05-23 NOTE — PROGRESS NOTES
Physical Therapy Daily Progress Note    VISIT#: 16    Subjective   Ana Plasencia reports 1/10 pain today in R shoulder.       Objective     See Exercise, Manual, and Modality Logs for complete treatment.     Assessment/Plan     Performed only strengthening exercises today due to patient having good passive ROM but still lacking with AROM and strengthening. Pt tolerated all exercises well but was fatigued at end of session due to weakness. Pt required cues to decrease shoulder elevation with shoulder flexion exercise and patient able to correct with cues. Will continue to progress patient as able.      Progress per Plan of Care and Progress strengthening /stabilization /functional activity            Timed:                 Manual Therapy:    0     mins  87084;     Therapeutic Exercise:    19     mins  83110;     Neuromuscular Sheila:    0    mins  17823;    Therapeutic Activity:     8     mins  01574;     Gait Trainin     mins  77167;     Ultrasound:     0     mins  04011;    Ionto                               0    mins   24381  Self pay                         0     mins [email or web address]    Un-Timed:  Electrical Stimulation:    0     mins  51989 ( )  Canalith Repos    0     mins 61642  Dry Needling     0     mins self-pay  Traction     0     mins 64913    Timed Treatment:   27   mins   Total Treatment:     27   mins    Dahlia Dacosta PT  Physical Therapist    PT SIGNATURE: Electronically signed by Dahlia Dacosta PT  [place] LICENSE: [id number]

## 2022-05-25 ENCOUNTER — TREATMENT (OUTPATIENT)
Dept: PHYSICAL THERAPY | Facility: CLINIC | Age: 62
End: 2022-05-25

## 2022-05-25 DIAGNOSIS — R29.898 WEAKNESS OF SHOULDER: ICD-10-CM

## 2022-05-25 DIAGNOSIS — M25.511 ACUTE PAIN OF RIGHT SHOULDER: Primary | ICD-10-CM

## 2022-05-25 DIAGNOSIS — M25.60 DECREASED RANGE OF MOTION: ICD-10-CM

## 2022-05-25 DIAGNOSIS — S42.291D HUMERAL HEAD FRACTURE, RIGHT, WITH ROUTINE HEALING, SUBSEQUENT ENCOUNTER: ICD-10-CM

## 2022-05-25 PROCEDURE — 97140 MANUAL THERAPY 1/> REGIONS: CPT | Performed by: PHYSICAL THERAPIST

## 2022-05-25 PROCEDURE — 97110 THERAPEUTIC EXERCISES: CPT | Performed by: PHYSICAL THERAPIST

## 2022-05-25 NOTE — PROGRESS NOTES
Physical Therapy Daily Treatment Note      Patient: Ana Plasencia   : 1960  Referring practitioner: Colton Anderson MD  Date of Initial Visit: Type: THERAPY  Noted: 3/21/2022  Today's Date: 2022  Patient seen for 17 sessions           Subjective Questionnaire:       Subjective     Objective   See Exercise, Manual, and Modality Logs for complete treatment.       Assessment & Plan     Assessment    Assessment details: Pt exhibits R shoulder weakness while performing therapeutic exercise.  Pt tolerates PROM and is most limited with IR and ER.  Continue with POC.        Visit Diagnoses:    ICD-10-CM ICD-9-CM   1. Acute pain of right shoulder  M25.511 719.41   2. Humeral head fracture, right, with routine healing, subsequent encounter  S42.291D V54.11   3. Decreased range of motion  M25.60 719.50   4. Weakness of shoulder  R29.898 719.61       Progress per Plan of Care and Progress strengthening /stabilization /functional activity           Timed:  Manual Therapy:    9     mins  48174;  Therapeutic Exercise:    20     mins  10204;     Neuromuscular Sheila:        mins  68210;    Therapeutic Activity:          mins  54109;     Gait Training:           mins  33017;     Ultrasound:          mins  66555;    Electrical Stimulation:         mins  75100 ( );  Aquatic Therapy          mins  52332    Untimed:  Electrical Stimulation:         mins  78982 ( );  Mechanical Traction:         mins  34421;     Timed Treatment:   29   mins   Total Treatment:     29   mins    Electronically signed    Clare Tran PTA  Physical Therapist Assistant    [place] license: [id number]

## 2022-06-02 ENCOUNTER — OFFICE VISIT (OUTPATIENT)
Dept: ORTHOPEDIC SURGERY | Facility: CLINIC | Age: 62
End: 2022-06-02

## 2022-06-02 ENCOUNTER — TREATMENT (OUTPATIENT)
Dept: PHYSICAL THERAPY | Facility: CLINIC | Age: 62
End: 2022-06-02

## 2022-06-02 VITALS — OXYGEN SATURATION: 97 % | BODY MASS INDEX: 36.37 KG/M2 | HEART RATE: 95 BPM | WEIGHT: 213 LBS | HEIGHT: 64 IN

## 2022-06-02 DIAGNOSIS — I10 ESSENTIAL HYPERTENSION: ICD-10-CM

## 2022-06-02 DIAGNOSIS — S42.211D CLOSED FRACTURE OF NECK OF RIGHT HUMERUS WITH ROUTINE HEALING, SUBSEQUENT ENCOUNTER: Primary | ICD-10-CM

## 2022-06-02 DIAGNOSIS — S43.004A SHOULDER DISLOCATION, RIGHT, INITIAL ENCOUNTER: ICD-10-CM

## 2022-06-02 DIAGNOSIS — S42.291D HUMERAL HEAD FRACTURE, RIGHT, WITH ROUTINE HEALING, SUBSEQUENT ENCOUNTER: ICD-10-CM

## 2022-06-02 DIAGNOSIS — M25.511 ACUTE PAIN OF RIGHT SHOULDER: Primary | ICD-10-CM

## 2022-06-02 DIAGNOSIS — R29.898 WEAKNESS OF SHOULDER: ICD-10-CM

## 2022-06-02 DIAGNOSIS — S46.011D TRAUMATIC INCOMPLETE TEAR OF RIGHT ROTATOR CUFF, SUBSEQUENT ENCOUNTER: ICD-10-CM

## 2022-06-02 DIAGNOSIS — M25.60 DECREASED RANGE OF MOTION: ICD-10-CM

## 2022-06-02 PROCEDURE — 97530 THERAPEUTIC ACTIVITIES: CPT | Performed by: PHYSICAL THERAPIST

## 2022-06-02 PROCEDURE — 99213 OFFICE O/P EST LOW 20 MIN: CPT | Performed by: PHYSICIAN ASSISTANT

## 2022-06-02 PROCEDURE — 97110 THERAPEUTIC EXERCISES: CPT | Performed by: PHYSICAL THERAPIST

## 2022-06-02 RX ORDER — HYDROCHLOROTHIAZIDE 12.5 MG/1
TABLET ORAL
Qty: 90 TABLET | Refills: 1 | Status: SHIPPED | OUTPATIENT
Start: 2022-06-02 | End: 2022-12-14 | Stop reason: SDUPTHER

## 2022-06-02 NOTE — PROGRESS NOTES
Physical Therapy Daily Progress Note    VISIT#: 18    Subjective   Ana Plasencia reports 0/10 pain in shoulder today. 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      Objective     See Exercise, Manual, and Modality Logs for complete treatment.     Assessment/Plan    Pt continues to make excellent progress with improving her shoulder ROM and strength. Pt tolerated all exercises today with no reports of increase in pain but patient did fatigue with exercises due to weakness.     Progress per Plan of Care and Progress strengthening /stabilization /functional activity            Timed:                 Manual Therapy:    0     mins  30402;     Therapeutic Exercise:    12     mins  86371;     Neuromuscular Sheila:    0    mins  30641;    Therapeutic Activity:     16     mins  73630;     Gait Trainin     mins  52087;     Ultrasound:     0     mins  81660;    Ionto                               0    mins   95497  Self pay                         0     mins [email or web address]    Un-Timed:  Electrical Stimulation:    0     mins  69320 ( )  Canalith Repos    0     mins 14152  Dry Needling     0     mins self-pay  Traction     0     mins 48283    Timed Treatment:   28   mins   Total Treatment:     28   mins    Dahlia Dacosta PT  Physical Therapist    PT SIGNATURE: Electronically signed by Dahlia Dacosta PT  [place] LICENSE: [id number]

## 2022-06-02 NOTE — PROGRESS NOTES
"Chief Complaint  Follow-up of the Right Shoulder    Subjective          Ana Plasencia is a 62 y.o. female  presents to [facility] for   History of Present Illness      Patient presents for follow-up evaluation of right shoulder pain, right shoulder dislocation, right humeral head fracture and right rotator cuff tear.  Patient states she is happy with her progress she states range of motion is better she states she has decreased pain she states she would still like to continue therapy for continued strengthening.  She is attending therapy twice a week she only takes ibuprofen occasionally.  She states she just retired from her job and is happy with her new life opportunities.  She denies new injury or symptoms of pain, no new complaints.      No Known Allergies     Social History     Socioeconomic History   • Marital status:    Tobacco Use   • Smoking status: Never Smoker   • Smokeless tobacco: Never Used   Vaping Use   • Vaping Use: Never used   Substance and Sexual Activity   • Alcohol use: Never   • Drug use: Never        REVIEW OF SYSTEMS    Constitutional: Denies fevers, chills, weight loss  Cardiovascular: Denies chest pain, shortness of breath  Skin: Denies rashes, acute skin changes  Neurologic: Denies headache, loss of consciousness  MSK: Right shoulder pain      Objective   Vital Signs:   Pulse 95   Ht 162.6 cm (64\")   Wt 96.6 kg (213 lb)   SpO2 97%   BMI 36.56 kg/m²     Body mass index is 36.56 kg/m².    Physical Exam    Right shoulder: Nontender to palpation, no pain with range of motion active forward elevation 150, active abduction 100, external rotation with abduction 80, internal rotation to her T12, 4+ supraspinatus, 5 out of 5 infraspinatus and subscapularis strength    Procedures    Imaging Results (Most Recent)     None           Result Review :   The following data was reviewed by: ISAIAH Nelson on 06/02/2022:               Assessment and " Plan    Diagnoses and all orders for this visit:    1. Closed fracture of neck of right humerus with routine healing, subsequent encounter (Primary)    2. Shoulder dislocation, right, subsequent encounter    3. Traumatic incomplete tear of right rotator cuff, subsequent encounter        Discussed diagnosis and treatment options with the patient she was advised recommend continuing physical therapy which she agreed to, follow-up in 8 weeks for reevaluation/possible release follow-up sooner if any new or concerning symptoms occur, patient agreed    Call or return if worsening symptoms.    Follow Up   Return in about 8 weeks (around 7/28/2022) for Recheck.  Patient was given instructions and counseling regarding her condition or for health maintenance advice. Please see specific information pulled into the AVS if appropriate.

## 2022-06-07 ENCOUNTER — TREATMENT (OUTPATIENT)
Dept: PHYSICAL THERAPY | Facility: CLINIC | Age: 62
End: 2022-06-07

## 2022-06-07 DIAGNOSIS — M25.511 ACUTE PAIN OF RIGHT SHOULDER: ICD-10-CM

## 2022-06-07 DIAGNOSIS — M25.60 DECREASED RANGE OF MOTION: ICD-10-CM

## 2022-06-07 DIAGNOSIS — R29.898 WEAKNESS OF SHOULDER: Primary | ICD-10-CM

## 2022-06-07 DIAGNOSIS — S42.291D HUMERAL HEAD FRACTURE, RIGHT, WITH ROUTINE HEALING, SUBSEQUENT ENCOUNTER: ICD-10-CM

## 2022-06-07 PROCEDURE — 97110 THERAPEUTIC EXERCISES: CPT | Performed by: PHYSICAL THERAPIST

## 2022-06-07 PROCEDURE — 97530 THERAPEUTIC ACTIVITIES: CPT | Performed by: PHYSICAL THERAPIST

## 2022-06-07 NOTE — PROGRESS NOTES
Physical Therapy Daily Treatment Note      Patient: Ana Plasencia   : 1960  Referring practitioner: Colton Anderson MD  Date of Initial Visit: Type: THERAPY  Noted: 3/21/2022  Today's Date: 2022  Patient seen for 19 sessions           Subjective Questionnaire:       Subjective Evaluation    History of Present Illness    Subjective comment: Pt reported R shoulder is getting better and 1/10 pain.       Objective   See Exercise, Manual, and Modality Logs for complete treatment.       Assessment & Plan     Assessment    Assessment details: Pt continues to have difficulty with reaching in the cabinet and behind her back.        Visit Diagnoses:    ICD-10-CM ICD-9-CM   1. Weakness of shoulder  R29.898 719.61   2. Decreased range of motion  M25.60 719.50   3. Humeral head fracture, right, with routine healing, subsequent encounter  S42.291D V54.11   4. Acute pain of right shoulder  M25.511 719.41       Progress per Plan of Care and Progress strengthening /stabilization /functional activity           Timed:  Manual Therapy:    4     mins  92527;  Therapeutic Exercise:    21      mins  79403;     Neuromuscular Sheila:        mins  90276;    Therapeutic Activity:     8     mins  56629;     Gait Training:           mins  52722;     Ultrasound:          mins  35875;    Electrical Stimulation:         mins  67972 ( );  Aquatic Therapy          mins  23282    Untimed:  Electrical Stimulation:         mins  80271 ( );  Mechanical Traction:         mins  21897;     Timed Treatment:   33   mins   Total Treatment:     33   mins    Electronically signed    Clare Tran PTA  Physical Therapist Assistant    [place] license: [id number]

## 2022-06-09 ENCOUNTER — TREATMENT (OUTPATIENT)
Dept: PHYSICAL THERAPY | Facility: CLINIC | Age: 62
End: 2022-06-09

## 2022-06-09 DIAGNOSIS — R29.898 WEAKNESS OF SHOULDER: Primary | ICD-10-CM

## 2022-06-09 DIAGNOSIS — M25.511 ACUTE PAIN OF RIGHT SHOULDER: ICD-10-CM

## 2022-06-09 DIAGNOSIS — S42.291D HUMERAL HEAD FRACTURE, RIGHT, WITH ROUTINE HEALING, SUBSEQUENT ENCOUNTER: ICD-10-CM

## 2022-06-09 DIAGNOSIS — M25.60 DECREASED RANGE OF MOTION: ICD-10-CM

## 2022-06-09 PROCEDURE — 97140 MANUAL THERAPY 1/> REGIONS: CPT | Performed by: PHYSICAL THERAPIST

## 2022-06-09 PROCEDURE — 97110 THERAPEUTIC EXERCISES: CPT | Performed by: PHYSICAL THERAPIST

## 2022-06-09 NOTE — PROGRESS NOTES
Physical Therapy Daily Progress Note        Patient: Ana Plasencia   : 1960  Diagnosis/ICD-10 Code:  Weakness of shoulder [R29.898]  Referring practitioner: Colton Anderson MD  Date of Initial Visit: Type: THERAPY  Noted: 3/21/2022  Today's Date: 2022  Patient seen for 20 sessions             Subjective   Ana Plasencia reports: shoulder doing better, states that she hasn't had much pain. Reports that she's noticed improved motion in her shoulder, but still limited over head.    Objective   Limited R shoulder Passive Range of Motion with flexion and External Rotation.     See Exercise, Manual, and Modality Logs for complete treatment.       Assessment/Plan  Ana progressing as evident by decreased overall shoulder pain. Pt tolerated exercises well, no complaints of increased pain or discomfort. Pt would benefit from skilled PT to address Range of Motion  and Strength deficits, pain management and any concerns with ADLs.       Progress per Plan of Care           Timed:  Manual Therapy:    10     mins  17724;  Therapeutic Exercise:    20     mins  38426;     Neuromuscular Sheila:        mins  62503;    Therapeutic Activity:          mins  73783;     Gait Training:           mins  49988;    Aquatic Therapy:          mins  95816;       Untimed:  Electrical Stimulation:         mins  99500 ( );  Mechanical Traction:         mins  28020;       Timed Treatment:   30   mins   Total Treatment:     30   mins      Electronically signed:   Marie Jerez PTA  Physical Therapist Assistant  [facility] License #: [id number]

## 2022-06-15 ENCOUNTER — TREATMENT (OUTPATIENT)
Dept: PHYSICAL THERAPY | Facility: CLINIC | Age: 62
End: 2022-06-15

## 2022-06-15 DIAGNOSIS — R29.898 WEAKNESS OF SHOULDER: Primary | ICD-10-CM

## 2022-06-15 DIAGNOSIS — M25.60 DECREASED RANGE OF MOTION: ICD-10-CM

## 2022-06-15 DIAGNOSIS — M25.511 ACUTE PAIN OF RIGHT SHOULDER: ICD-10-CM

## 2022-06-15 DIAGNOSIS — S42.291D HUMERAL HEAD FRACTURE, RIGHT, WITH ROUTINE HEALING, SUBSEQUENT ENCOUNTER: ICD-10-CM

## 2022-06-15 PROCEDURE — 97530 THERAPEUTIC ACTIVITIES: CPT | Performed by: PHYSICAL THERAPIST

## 2022-06-15 PROCEDURE — 97110 THERAPEUTIC EXERCISES: CPT | Performed by: PHYSICAL THERAPIST

## 2022-06-15 PROCEDURE — 97140 MANUAL THERAPY 1/> REGIONS: CPT | Performed by: PHYSICAL THERAPIST

## 2022-06-15 NOTE — PROGRESS NOTES
Physical Therapy Daily Treatment Note      Patient: Ana Plasencia   : 1960  Referring practitioner: Colton Anderson MD  Date of Initial Visit: Type: THERAPY  Noted: 3/21/2022  Today's Date: 6/15/2022  Patient seen for 21 sessions           Subjective  Ana Plasencia reports: not so much pain. It is getting better.       Objective   See Exercise, Manual, and Modality Logs for complete treatment.       Assessment/Plan  Ana continues to show improvements in her AROM, strength and function. Mirror assessment for improving posture. VC and tactile prompts for scapular retraction to aid shoulder AROM.    Visit Diagnoses:    ICD-10-CM ICD-9-CM   1. Weakness of shoulder  R29.898 719.61   2. Decreased range of motion  M25.60 719.50   3. Humeral head fracture, right, with routine healing, subsequent encounter  S42.291D V54.11   4. Acute pain of right shoulder  M25.511 719.41       Progress per Plan of Care and Progress strengthening /stabilization /functional activity           Timed:  Manual Therapy:    10     mins  66592;  Therapeutic Exercise:   12      mins  46250;     Neuromuscular Sheila:        mins  54875;    Therapeutic Activity:     9     mins  34990;     Gait Training:           mins  27120;     Ultrasound:          mins  35446;    Electrical Stimulation:         mins  08997 ( );  Aquatics  __   mins   03702    Untimed:  Electrical Stimulation:         mins  20681 ( );  Mechanical Traction:         mins  38562;     Timed Treatment:   31   mins   Total Treatment:     31   mins    Electronically Signed:  Karen Lopez PTA  Physical Therapist Assistant    [facility] license [id number]

## 2022-06-20 ENCOUNTER — OFFICE VISIT (OUTPATIENT)
Dept: FAMILY MEDICINE CLINIC | Facility: CLINIC | Age: 62
End: 2022-06-20

## 2022-06-20 ENCOUNTER — TREATMENT (OUTPATIENT)
Dept: PHYSICAL THERAPY | Facility: CLINIC | Age: 62
End: 2022-06-20

## 2022-06-20 VITALS
TEMPERATURE: 99.2 F | WEIGHT: 214 LBS | BODY MASS INDEX: 36.54 KG/M2 | HEART RATE: 88 BPM | OXYGEN SATURATION: 97 % | DIASTOLIC BLOOD PRESSURE: 63 MMHG | HEIGHT: 64 IN | SYSTOLIC BLOOD PRESSURE: 127 MMHG

## 2022-06-20 DIAGNOSIS — G89.29 CHRONIC RIGHT SHOULDER PAIN: ICD-10-CM

## 2022-06-20 DIAGNOSIS — M25.60 DECREASED RANGE OF MOTION: ICD-10-CM

## 2022-06-20 DIAGNOSIS — M25.511 ACUTE PAIN OF RIGHT SHOULDER: ICD-10-CM

## 2022-06-20 DIAGNOSIS — R29.898 WEAKNESS OF SHOULDER: Primary | ICD-10-CM

## 2022-06-20 DIAGNOSIS — S42.291D HUMERAL HEAD FRACTURE, RIGHT, WITH ROUTINE HEALING, SUBSEQUENT ENCOUNTER: ICD-10-CM

## 2022-06-20 DIAGNOSIS — E03.9 HYPOTHYROIDISM, UNSPECIFIED TYPE: ICD-10-CM

## 2022-06-20 DIAGNOSIS — L30.9 DERMATITIS: ICD-10-CM

## 2022-06-20 DIAGNOSIS — F33.41 RECURRENT MAJOR DEPRESSIVE DISORDER, IN PARTIAL REMISSION: ICD-10-CM

## 2022-06-20 DIAGNOSIS — E78.5 HYPERLIPIDEMIA, UNSPECIFIED HYPERLIPIDEMIA TYPE: ICD-10-CM

## 2022-06-20 DIAGNOSIS — E55.9 VITAMIN D DEFICIENCY: ICD-10-CM

## 2022-06-20 DIAGNOSIS — F41.9 ANXIETY: ICD-10-CM

## 2022-06-20 DIAGNOSIS — M25.511 CHRONIC RIGHT SHOULDER PAIN: ICD-10-CM

## 2022-06-20 DIAGNOSIS — I10 ESSENTIAL HYPERTENSION: Primary | ICD-10-CM

## 2022-06-20 DIAGNOSIS — R73.01 IMPAIRED FASTING BLOOD SUGAR: ICD-10-CM

## 2022-06-20 DIAGNOSIS — Z12.31 VISIT FOR SCREENING MAMMOGRAM: ICD-10-CM

## 2022-06-20 PROCEDURE — 99214 OFFICE O/P EST MOD 30 MIN: CPT | Performed by: NURSE PRACTITIONER

## 2022-06-20 PROCEDURE — 97110 THERAPEUTIC EXERCISES: CPT | Performed by: PHYSICAL THERAPIST

## 2022-06-20 RX ORDER — PAROXETINE 10 MG/1
10 TABLET, FILM COATED ORAL DAILY
Qty: 90 TABLET | Refills: 1 | Status: SHIPPED | OUTPATIENT
Start: 2022-06-20 | End: 2022-12-14 | Stop reason: SDUPTHER

## 2022-06-20 RX ORDER — AMLODIPINE BESYLATE 5 MG/1
5 TABLET ORAL DAILY
Qty: 90 TABLET | Refills: 1 | Status: SHIPPED | OUTPATIENT
Start: 2022-06-20 | End: 2022-12-14 | Stop reason: SDUPTHER

## 2022-06-20 RX ORDER — CLOBETASOL PROPIONATE 0.05 MG/G
1 GEL TOPICAL EVERY 12 HOURS SCHEDULED
Qty: 30 G | Refills: 1 | Status: SHIPPED | OUTPATIENT
Start: 2022-06-20

## 2022-06-20 RX ORDER — PRAVASTATIN SODIUM 20 MG
20 TABLET ORAL NIGHTLY
Qty: 90 TABLET | Refills: 1 | Status: SHIPPED | OUTPATIENT
Start: 2022-06-20 | End: 2022-12-14 | Stop reason: SDUPTHER

## 2022-06-20 NOTE — PROGRESS NOTES
Re-Assessment / Re-Certification        Patient: Ana Plasencia   : 1960  Diagnosis/ICD-10 Code:  Weakness of shoulder [R29.898]  Referring practitioner: Colton Anderson MD  Date of Initial Visit: Type: THERAPY  Noted: 3/21/2022  Today's Date: 2022  Patient seen for 22 sessions      Subjective:   Ana Plasencia reports: 0/10  Subjective Questionnaire: QuickDASH: 16/55 - 1-19%  Clinical Progress: improved  Home Program Compliance: Yes  Treatment has included: therapeutic exercise, neuromuscular re-education, manual therapy and therapeutic activity    Subjective     Pt reports she is still having difficulty with reaching into cabinets and reaching behind her back.     Objective     R shoulder active flexion: 130 degrees  R shoulder active abduction: 115 degrees  R shoulder IR BTB: L3  R shoulder ER: C1     R shoulder passive flexion: 160 degrees  R shoulder passive abduction: 150 degrees  R shoulder passive IR at 45 degrees: 60 degrees  R shoulder passive ER at 45 degrees: 30 degrees     R shoulder flexion: 3+/5  R shoulder abduction: 3/5  R shoulder IR: 5/5  R shoulder ER: 5/5  R elbow flexion and extension: 5/5    Assessment/Plan     Pt has maintained shoulder ROM and strength since last re-evaluation but hasn't made additional progress. Discussed with patient about patient's progress and plan for discharge. Discussed with patient about exercises to continue at home and provided patient handout and theraband to continue exercises at home in order to maintain strength and ROM. Will discharge patient at this time.       1. The patient has limited ROM of the right shoulder.    LTG 1: 12 weeks: The patient will demonstrate 170 degrees of right shoulder flexion, 170 degrees of shoulder abduction, 80 degrees of shoulder external rotation, and shoulder internal rotation to L1 to allow the patient to reach into upper kitchen cabinets and manipulate clothing behind the back with greater ease.     NOT  MET    STG 1a: 6 weeks: The patient will demonstrate 140 degrees of right shoulder flexion, 140 degrees of shoulder abduction, and shoulder internal rotation to L5 to allow the patient to reach into upper kitchen cabinets and manipulate clothing behind the back with greater ease.    NOT MET    2. The patient has limited strength of the right shoulder.    LTG 2: 12 weeks: The patient will demonstrate 5/5 strength for right shoulder flexion, abduction, external rotation, and internal rotation in order to demonstrate improved shoulder stability.     NOT MET    STG 2a: 6 weeks: The patient will demonstrate 4+/5 strength for right shoulder flexion, abduction, external rotation, and internal rotation.     NOT MET      STG2b: 6 weeks: The patient will be independent with home exercises.     MET    3. The patient complains of pain to the right shoulder.      LTG 3: 12 weeks: The patient will report a pain rating of 1/10 or better in order to improve sleep quality and tolerance to performance of activities of daily living.     MET    STG 3a: 6 weeks: The patient will report a pain rating of 3/10 or better.     MET    Carrying, Moving, and Handling Objects Functional Limitation    LTG 4: 12 weeks: The patient will demonstrate 0% limitation by achieving a score of 11 on the Quick DASH.     NOT MET    STG 4a: 6 weeks: The patient will demonstrate 1-19%% limitation by achieving a score of 12-19 on the Quick DASH.     MET    Progress toward previous goals: Partially Met    See Exercise, Manual, and Modality Logs for complete treatment.         Recommendations: Discharge    PT Signature: Electronically signed by Dahlia Dacosta PT  [place] LICENSE: [id number]      Based upon review of the patient's progress and continued therapy plan, it is my medical opinion that Ana Plasencia should continue physical therapy treatment at [facility]  [address]  [address]  [phone].    Signature: __________________________________  Colton Anderson MD    Timed:           Timed:         Manual Therapy:    0     mins  98504;     Therapeutic Exercise:    16     mins  57295;     Neuromuscular Sheila:    0    mins  83286;    Therapeutic Activity:     0     mins  83281;     Gait Trainin     mins  08849;     Ultrasound:     0     mins  42955;    Ionto                               0    mins   06219  Self pay                         0     mins [id number]    Un-Timed:  Electrical Stimulation:    0     mins  15181 ( )  Canalith Repos    0     mins 49535  Dry Needling     0     mins self-pay  Traction     0     mins 25862  Re-Eval                           0    mins  32175    Timed Treatment:   16   mins   Total Treatment:     25   mins    PT SIGNATURE: Electronically signed by Dahlia Dacosta PT  [place] LICENSE: [id number]     DATE TREATMENT INITIATED: 2022    90 Day Recertification  Certification Period: 2022 thru 2022  I certify that the therapy services are furnished while this patient is under my care.  The services outlined above are required by this patient, and will be reviewed every 90 days.     PHYSICIAN: Colton Anderson MD   NPI: [id number]      DATE:     Please sign and return via fax to [phone] Thank you, [facility].

## 2022-06-20 NOTE — PROGRESS NOTES
Chief Complaint  Med Refill (Multi maintenance medication refill request), Follow-up (7M follow up, possible new blood labs), and Skin concern (Bilateral forearm, lizandro bumps and itch)    Subjective          Ana Plasencia is a 62 y.o. female who presents to [facility]    History of Present Illness    Chronic right shoulder pain - pt is in therapy after fall on ice. Pt reports some improvement in ROM however she has limited extension. Pt wants to wait until nov/dec for surgery.      HTN - well controlled.     Hypothyroid - Pt reports she is sleeping a lot and feels like her thyroid is abnormal.     Dermatitis lashon arms denies any new products.       PHQ-2 Total Score: 0   PHQ-9 Total Score: 0        Review of Systems   Constitutional: Negative for chills, fatigue and fever.   Respiratory: Negative for cough and shortness of breath.    Cardiovascular: Negative for chest pain and palpitations.   Gastrointestinal: Negative for constipation, diarrhea, nausea and vomiting.   Musculoskeletal: Negative for back pain and neck pain.   Skin: Positive for rash.   Neurological: Negative for dizziness and headaches.   All other systems reviewed and are negative.         Medical History: has a past medical history of Allergic rhinitis, Anxiety, Arthritis, Back pain, Depression, Disease of thyroid gland, Essential hypertension, Glaucoma (05/20/2021), Gout, Hypothyroidism, Memory loss, Migraine headache, Night sweats, and Post-menopausal.     Surgical History: has a past surgical history that includes Tonsillectomy and [place] tooth extraction.     Family History: family history includes Alzheimer's disease in her mother; Diabetes in her father and sister; Heart disease in her father; Hypertension in her mother; Stroke in her maternal grandmother.     Social History: reports that she has never smoked. She has never used smokeless tobacco. She reports that she does not drink alcohol and does not  use drugs.    Allergies: Patient has no known allergies.      Health Maintenance Due   Topic Date Due   • ANNUAL PHYSICAL  Never done   • TDAP/TD VACCINES (1 - Tdap) Never done   • ZOSTER VACCINE (1 of 2) Never done   • HEPATITIS C SCREENING  Never done   • PAP SMEAR  Never done   • COVID-19 Vaccine (3 - Booster for Moderna series) 07/09/2021            Current Outpatient Medications:   •  amLODIPine (NORVASC) 5 MG tablet, Take 1 tablet by mouth Daily. for blood pressure, Disp: 90 tablet, Rfl: 1  •  bimatoprost (LUMIGAN) 0.01 % ophthalmic drops, 1 drop Every Night., Disp: , Rfl:   •  Cholecalciferol 50 MCG (2000 UT) tablet, Take 1 tablet by mouth Daily., Disp: 90 tablet, Rfl: 1  •  hydroCHLOROthiazide (HYDRODIURIL) 12.5 MG tablet, TAKE 1 TABLET BY MOUTH ONCE DAILY, Disp: 90 tablet, Rfl: 1  •  ketotifen (ZADITOR) 0.025 % ophthalmic solution, , Disp: , Rfl:   •  levothyroxine (SYNTHROID, LEVOTHROID) 50 MCG tablet, Take 1 tablet by mouth Daily., Disp: 90 tablet, Rfl: 1  •  Loratadine 10 MG capsule, Take  by mouth., Disp: , Rfl:   •  Lotemax SM 0.38 % gel, INSTILL ONE DROP RIGHT EYE FOUR TIMES DAILY FOR 5 DAYS THEN TWICE DAILY FOR 5 DAYS, Disp: , Rfl:   •  PARoxetine (PAXIL) 10 MG tablet, Take 1 tablet by mouth Daily., Disp: 90 tablet, Rfl: 1  •  pravastatin (PRAVACHOL) 20 MG tablet, Take 1 tablet by mouth Every Night., Disp: 90 tablet, Rfl: 1  •  travoprost, KAREN free, (TRAVATAN) 0.004 % solution ophthalmic solution, , Disp: , Rfl:   •  clobetasol (TEMOVATE) 0.05 % gel, Apply 1 application topically to the appropriate area as directed Every 12 (Twelve) Hours., Disp: 30 g, Rfl: 1      Immunization History   Administered Date(s) Administered   • COVID-19 (MODERNA) 1st, 2nd, 3rd Dose Only 01/12/2021, 02/09/2021   • DTaP 09/04/2019   • FluLaval/Fluarix/Fluzone >6 11/22/2021   • Fluzone Split Quad (Multi-dose) 10/01/2020         Objective       Vitals:    06/20/22 1335   BP: 127/63   BP Location: Left arm   Patient Position:  "Sitting   Cuff Size: Large Adult   Pulse: 88   Temp: 99.2 °F (37.3 °C)   TempSrc: Temporal   SpO2: 97%   Weight: 97.1 kg (214 lb)   Height: 162.6 cm (64\")   PainSc: 0-No pain      Body mass index is 36.73 kg/m².   Wt Readings from Last 3 Encounters:   06/20/22 97.1 kg (214 lb)   06/02/22 96.6 kg (213 lb)   04/21/22 97.1 kg (214 lb)      BP Readings from Last 3 Encounters:   06/20/22 127/63   01/22/22 177/75   11/22/21 132/72        Physical Exam  Vitals reviewed.   Constitutional:       Appearance: Normal appearance. She is well-developed.   HENT:      Head: Normocephalic and atraumatic.   Eyes:      Conjunctiva/sclera: Conjunctivae normal.      Pupils: Pupils are equal, round, and reactive to light.   Cardiovascular:      Rate and Rhythm: Normal rate and regular rhythm.      Heart sounds: Normal heart sounds. No murmur heard.  Pulmonary:      Effort: Pulmonary effort is normal.      Breath sounds: Normal breath sounds. No wheezing or rhonchi.   Abdominal:      General: Bowel sounds are normal. There is no distension.      Palpations: Abdomen is soft.      Tenderness: There is no abdominal tenderness.   Skin:     General: Skin is warm and dry.   Neurological:      Mental Status: She is alert and oriented to person, place, and time.   Psychiatric:         Mood and Affect: Mood and affect normal.         Behavior: Behavior normal.         Thought Content: Thought content normal.         Judgment: Judgment normal.             Result Review :     Common labs    Common Labsle 11/22/21 11/22/21 11/22/21    1530 1530 1530   Glucose  91    BUN  19    Creatinine  0.96    eGFR Non African Am  59 (A)    Sodium  140    Potassium  4.4    Chloride  103    Calcium  9.6    Albumin  4.50    Total Bilirubin  0.2    Alkaline Phosphatase  123 (A)    AST (SGOT)  22    ALT (SGPT)  23    WBC 5.79     Hemoglobin 12.4     Hematocrit 37.7     Platelets 221     Total Cholesterol   261 (A)   Triglycerides   245 (A)   HDL Cholesterol   56   LDL " Cholesterol    160 (A)   (A) Abnormal value                       Assessment and Plan        Diagnoses and all orders for this visit:    1. Essential hypertension (Primary)  -     Comprehensive Metabolic Panel; Future  -     CBC (No Diff); Future  -     amLODIPine (NORVASC) 5 MG tablet; Take 1 tablet by mouth Daily. for blood pressure  Dispense: 90 tablet; Refill: 1    2. Chronic right shoulder pain  Comments:  continue with PT, follow up when ready for further work up.     3. Hypothyroidism, unspecified type  -     TSH; Future    4. Vitamin D deficiency  -     Vitamin D 25 Hydroxy  -     Cholecalciferol 50 MCG (2000 UT) tablet; Take 1 tablet by mouth Daily.  Dispense: 90 tablet; Refill: 1    5. Dermatitis  Comments:  Continue withe cereve. Use free and clear products and dove white bar.   Orders:  -     clobetasol (TEMOVATE) 0.05 % gel; Apply 1 application topically to the appropriate area as directed Every 12 (Twelve) Hours.  Dispense: 30 g; Refill: 1    6. Anxiety  Comments:  Continue Prozac    7. Recurrent major depressive disorder, in partial remission (HCC)  -     PARoxetine (PAXIL) 10 MG tablet; Take 1 tablet by mouth Daily.  Dispense: 90 tablet; Refill: 1    8. Hyperlipidemia, unspecified hyperlipidemia type  -     Lipid Panel; Future  -     pravastatin (PRAVACHOL) 20 MG tablet; Take 1 tablet by mouth Every Night.  Dispense: 90 tablet; Refill: 1        Follow Up     Return in about 6 months (around 12/20/2022) for Next scheduled follow up.    Patient was given instructions and counseling regarding her condition or for health maintenance advice. Please see specific information pulled into the AVS if appropriate.     JAXSON Schaffer

## 2022-06-21 ENCOUNTER — LAB (OUTPATIENT)
Dept: LAB | Facility: HOSPITAL | Age: 62
End: 2022-06-21

## 2022-06-21 DIAGNOSIS — E78.5 HYPERLIPIDEMIA, UNSPECIFIED HYPERLIPIDEMIA TYPE: ICD-10-CM

## 2022-06-21 DIAGNOSIS — E03.9 HYPOTHYROIDISM, UNSPECIFIED TYPE: ICD-10-CM

## 2022-06-21 DIAGNOSIS — I10 ESSENTIAL HYPERTENSION: ICD-10-CM

## 2022-06-21 LAB
25(OH)D3 SERPL-MCNC: 43.2 NG/ML (ref 30–100)
ALBUMIN SERPL-MCNC: 4.3 G/DL (ref 3.5–5.2)
ALBUMIN/GLOB SERPL: 1.7 G/DL
ALP SERPL-CCNC: 97 U/L (ref 39–117)
ALT SERPL W P-5'-P-CCNC: 20 U/L (ref 1–33)
ANION GAP SERPL CALCULATED.3IONS-SCNC: 12 MMOL/L (ref 5–15)
AST SERPL-CCNC: 19 U/L (ref 1–32)
BILIRUB SERPL-MCNC: 0.3 MG/DL (ref 0–1.2)
BUN SERPL-MCNC: 15 MG/DL (ref 8–23)
BUN/CREAT SERPL: 17 (ref 7–25)
CALCIUM SPEC-SCNC: 9.2 MG/DL (ref 8.6–10.5)
CHLORIDE SERPL-SCNC: 103 MMOL/L (ref 98–107)
CHOLEST SERPL-MCNC: 180 MG/DL (ref 0–200)
CO2 SERPL-SCNC: 26 MMOL/L (ref 22–29)
CREAT SERPL-MCNC: 0.88 MG/DL (ref 0.57–1)
DEPRECATED RDW RBC AUTO: 40.8 FL (ref 37–54)
EGFRCR SERPLBLD CKD-EPI 2021: 75.3 ML/MIN/1.73
ERYTHROCYTE [DISTWIDTH] IN BLOOD BY AUTOMATED COUNT: 13.3 % (ref 12.3–15.4)
GLOBULIN UR ELPH-MCNC: 2.5 GM/DL
GLUCOSE SERPL-MCNC: 116 MG/DL (ref 65–99)
HBA1C MFR BLD: 5.6 % (ref 4.8–5.6)
HCT VFR BLD AUTO: 35.5 % (ref 34–46.6)
HDLC SERPL-MCNC: 51 MG/DL (ref 40–60)
HGB BLD-MCNC: 12.3 G/DL (ref 12–15.9)
LDLC SERPL CALC-MCNC: 106 MG/DL (ref 0–100)
LDLC/HDLC SERPL: 2.01 {RATIO}
MCH RBC QN AUTO: 29.4 PG (ref 26.6–33)
MCHC RBC AUTO-ENTMCNC: 34.6 G/DL (ref 31.5–35.7)
MCV RBC AUTO: 84.7 FL (ref 79–97)
PLATELET # BLD AUTO: 221 10*3/MM3 (ref 140–450)
PMV BLD AUTO: 10.8 FL (ref 6–12)
POTASSIUM SERPL-SCNC: 3.8 MMOL/L (ref 3.5–5.2)
PROT SERPL-MCNC: 6.8 G/DL (ref 6–8.5)
RBC # BLD AUTO: 4.19 10*6/MM3 (ref 3.77–5.28)
SODIUM SERPL-SCNC: 141 MMOL/L (ref 136–145)
TRIGL SERPL-MCNC: 132 MG/DL (ref 0–150)
TSH SERPL DL<=0.05 MIU/L-ACNC: 3.2 UIU/ML (ref 0.27–4.2)
VLDLC SERPL-MCNC: 23 MG/DL (ref 5–40)
WBC NRBC COR # BLD: 4.49 10*3/MM3 (ref 3.4–10.8)

## 2022-06-21 PROCEDURE — 80061 LIPID PANEL: CPT

## 2022-06-21 PROCEDURE — 80050 GENERAL HEALTH PANEL: CPT

## 2022-06-21 PROCEDURE — 82306 VITAMIN D 25 HYDROXY: CPT | Performed by: NURSE PRACTITIONER

## 2022-06-21 PROCEDURE — 36415 COLL VENOUS BLD VENIPUNCTURE: CPT

## 2022-06-21 PROCEDURE — 83036 HEMOGLOBIN GLYCOSYLATED A1C: CPT | Performed by: NURSE PRACTITIONER

## 2022-06-22 DIAGNOSIS — R73.9 ELEVATED BLOOD SUGAR: Primary | ICD-10-CM

## 2022-07-28 ENCOUNTER — OFFICE VISIT (OUTPATIENT)
Dept: ORTHOPEDIC SURGERY | Facility: CLINIC | Age: 62
End: 2022-07-28

## 2022-07-28 VITALS — HEIGHT: 64 IN | BODY MASS INDEX: 36.54 KG/M2 | WEIGHT: 214 LBS

## 2022-07-28 DIAGNOSIS — S43.004A SHOULDER DISLOCATION, RIGHT, INITIAL ENCOUNTER: ICD-10-CM

## 2022-07-28 DIAGNOSIS — S42.211D CLOSED FRACTURE OF NECK OF RIGHT HUMERUS WITH ROUTINE HEALING, SUBSEQUENT ENCOUNTER: Primary | ICD-10-CM

## 2022-07-28 DIAGNOSIS — S46.011D TRAUMATIC INCOMPLETE TEAR OF RIGHT ROTATOR CUFF, SUBSEQUENT ENCOUNTER: ICD-10-CM

## 2022-07-28 PROCEDURE — 99213 OFFICE O/P EST LOW 20 MIN: CPT | Performed by: PHYSICIAN ASSISTANT

## 2022-07-28 NOTE — PROGRESS NOTES
"Chief Complaint  Pain and Follow-up of the Right Shoulder    Subjective          Ana Plasencia is a 62 y.o. female  presents to [facility] for   History of Present Illness      Patient presents for follow-up evaluation right shoulder pain, right shoulder dislocation, right humeral head fracture and right rotator cuff tear.  Patient has been treating her shoulder conservatively with physical therapy she states physical therapy released her 4 weeks ago stating that they had reached a plateau with her progress.  Patient states she is able to do most activities of daily without difficulty she states she has been working on home exercises on her own.  Patient brought up today that she may consider surgical intervention in the near future, she states that she is worried about the future use of the shoulder and that if there is a surgery that could help her have more strength and range of motion she would be considering this.  She has never mentioned this before she has been happy in the past with her current progress.  She denies new injury or symptoms of pain, states range of motion has improved.      No Known Allergies     Social History     Socioeconomic History   • Marital status:    Tobacco Use   • Smoking status: Never Smoker   • Smokeless tobacco: Never Used   Vaping Use   • Vaping Use: Never used   Substance and Sexual Activity   • Alcohol use: Never   • Drug use: Never   • Sexual activity: Defer        REVIEW OF SYSTEMS    Constitutional: Denies fevers, chills, weight loss  Cardiovascular: Denies chest pain, shortness of breath  Skin: Denies rashes, acute skin changes  Neurologic: Denies headache, loss of consciousness  MSK: Right shoulder pain      Objective   Vital Signs:   Ht 162.6 cm (64\")   Wt 97.1 kg (214 lb)   BMI 36.73 kg/m²     Body mass index is 36.73 kg/m².    Physical Exam    Right shoulder: Nontender to palpation, no pain with range of motion, active forward " elevation 160, active abduction 100, external rotation with abduction 80, internal rotation to T12, 4+ supraspinatus, 5/5 infraspinatus and subscapularis    Procedures    Imaging Results (Most Recent)     None           Result Review :   The following data was reviewed by: ISAIAH Nelson on 07/28/2022:               Assessment and Plan    Diagnoses and all orders for this visit:    1. Closed fracture of neck of right humerus with routine healing, subsequent encounter (Primary)  -     MRI Shoulder Right Without Contrast; Future    2. Shoulder dislocation, right, subsequent encounter  -     MRI Shoulder Right Without Contrast; Future    3. Traumatic incomplete tear of right rotator cuff, subsequent encounter  -     MRI Shoulder Right Without Contrast; Future        Discussed diagnosis and treatment options with the patient she was advised we will order MRI of the right shoulder for further evaluation, follow-up after MRI.    Call or return if worsening symptoms.    Follow Up   Return for After MRI.  Patient was given instructions and counseling regarding her condition or for health maintenance advice. Please see specific information pulled into the AVS if appropriate.

## 2022-08-08 ENCOUNTER — HOSPITAL ENCOUNTER (OUTPATIENT)
Dept: MRI IMAGING | Facility: HOSPITAL | Age: 62
Discharge: HOME | End: 2022-08-08
Admitting: PHYSICIAN ASSISTANT

## 2022-08-08 DIAGNOSIS — S46.011D TRAUMATIC INCOMPLETE TEAR OF RIGHT ROTATOR CUFF, SUBSEQUENT ENCOUNTER: ICD-10-CM

## 2022-08-08 DIAGNOSIS — S42.211D CLOSED FRACTURE OF NECK OF RIGHT HUMERUS WITH ROUTINE HEALING, SUBSEQUENT ENCOUNTER: ICD-10-CM

## 2022-08-08 DIAGNOSIS — S43.004A SHOULDER DISLOCATION, RIGHT, INITIAL ENCOUNTER: ICD-10-CM

## 2022-08-08 PROCEDURE — 73221 MRI JOINT UPR EXTREM W/O DYE: CPT

## 2022-08-26 RX ORDER — LEVOTHYROXINE SODIUM 0.05 MG/1
TABLET ORAL
Qty: 90 TABLET | Refills: 0 | Status: SHIPPED | OUTPATIENT
Start: 2022-08-26 | End: 2022-11-30

## 2022-09-20 ENCOUNTER — HOSPITAL ENCOUNTER (OUTPATIENT)
Dept: MAMMOGRAPHY | Facility: HOSPITAL | Age: 62
Discharge: HOME | End: 2022-09-20
Admitting: NURSE PRACTITIONER

## 2022-09-20 DIAGNOSIS — Z12.31 VISIT FOR SCREENING MAMMOGRAM: ICD-10-CM

## 2022-09-20 PROCEDURE — 77067 SCR MAMMO BI INCL CAD: CPT

## 2022-09-20 PROCEDURE — 77063 BREAST TOMOSYNTHESIS BI: CPT

## 2022-11-30 RX ORDER — LEVOTHYROXINE SODIUM 0.05 MG/1
TABLET ORAL
Qty: 90 TABLET | Refills: 0 | Status: SHIPPED | OUTPATIENT
Start: 2022-11-30 | End: 2022-12-14 | Stop reason: SDUPTHER

## 2022-12-14 ENCOUNTER — OFFICE VISIT (OUTPATIENT)
Dept: FAMILY MEDICINE CLINIC | Facility: CLINIC | Age: 62
End: 2022-12-14

## 2022-12-14 VITALS
OXYGEN SATURATION: 97 % | WEIGHT: 210.8 LBS | HEART RATE: 71 BPM | SYSTOLIC BLOOD PRESSURE: 123 MMHG | TEMPERATURE: 98.7 F | BODY MASS INDEX: 36.18 KG/M2 | DIASTOLIC BLOOD PRESSURE: 64 MMHG

## 2022-12-14 DIAGNOSIS — E78.5 HYPERLIPIDEMIA, UNSPECIFIED HYPERLIPIDEMIA TYPE: ICD-10-CM

## 2022-12-14 DIAGNOSIS — F33.41 RECURRENT MAJOR DEPRESSIVE DISORDER, IN PARTIAL REMISSION: ICD-10-CM

## 2022-12-14 DIAGNOSIS — E03.9 HYPOTHYROIDISM, UNSPECIFIED TYPE: Primary | ICD-10-CM

## 2022-12-14 DIAGNOSIS — I10 ESSENTIAL HYPERTENSION: ICD-10-CM

## 2022-12-14 DIAGNOSIS — Z12.11 COLON CANCER SCREENING: ICD-10-CM

## 2022-12-14 DIAGNOSIS — L98.9 SKIN LESION OF CHEST WALL: ICD-10-CM

## 2022-12-14 DIAGNOSIS — E55.9 VITAMIN D DEFICIENCY: ICD-10-CM

## 2022-12-14 DIAGNOSIS — Z11.59 NEED FOR HEPATITIS C SCREENING TEST: ICD-10-CM

## 2022-12-14 LAB
ALBUMIN SERPL-MCNC: 5 G/DL (ref 3.5–5.2)
ALBUMIN/GLOB SERPL: 2.2 G/DL
ALP SERPL-CCNC: 116 U/L (ref 39–117)
ALT SERPL W P-5'-P-CCNC: 23 U/L (ref 1–33)
ANION GAP SERPL CALCULATED.3IONS-SCNC: 12.2 MMOL/L (ref 5–15)
AST SERPL-CCNC: 22 U/L (ref 1–32)
BILIRUB SERPL-MCNC: 0.4 MG/DL (ref 0–1.2)
BUN SERPL-MCNC: 14 MG/DL (ref 8–23)
BUN/CREAT SERPL: 14.7 (ref 7–25)
CALCIUM SPEC-SCNC: 10 MG/DL (ref 8.6–10.5)
CHLORIDE SERPL-SCNC: 102 MMOL/L (ref 98–107)
CHOLEST SERPL-MCNC: 197 MG/DL (ref 0–200)
CO2 SERPL-SCNC: 28.8 MMOL/L (ref 22–29)
CREAT SERPL-MCNC: 0.95 MG/DL (ref 0.57–1)
EGFRCR SERPLBLD CKD-EPI 2021: 68.7 ML/MIN/1.73
GLOBULIN UR ELPH-MCNC: 2.3 GM/DL
GLUCOSE SERPL-MCNC: 110 MG/DL (ref 65–99)
HCV AB SER DONR QL: NORMAL
HDLC SERPL-MCNC: 55 MG/DL (ref 40–60)
LDLC SERPL CALC-MCNC: 121 MG/DL (ref 0–100)
LDLC/HDLC SERPL: 2.15 {RATIO}
POTASSIUM SERPL-SCNC: 4.7 MMOL/L (ref 3.5–5.2)
PROT SERPL-MCNC: 7.3 G/DL (ref 6–8.5)
SODIUM SERPL-SCNC: 143 MMOL/L (ref 136–145)
TRIGL SERPL-MCNC: 119 MG/DL (ref 0–150)
TSH SERPL DL<=0.05 MIU/L-ACNC: 2.05 UIU/ML (ref 0.27–4.2)
VLDLC SERPL-MCNC: 21 MG/DL (ref 5–40)

## 2022-12-14 PROCEDURE — 86803 HEPATITIS C AB TEST: CPT | Performed by: NURSE PRACTITIONER

## 2022-12-14 PROCEDURE — 80053 COMPREHEN METABOLIC PANEL: CPT | Performed by: NURSE PRACTITIONER

## 2022-12-14 PROCEDURE — 80061 LIPID PANEL: CPT | Performed by: NURSE PRACTITIONER

## 2022-12-14 PROCEDURE — 82306 VITAMIN D 25 HYDROXY: CPT | Performed by: NURSE PRACTITIONER

## 2022-12-14 PROCEDURE — 84443 ASSAY THYROID STIM HORMONE: CPT | Performed by: NURSE PRACTITIONER

## 2022-12-14 PROCEDURE — 99214 OFFICE O/P EST MOD 30 MIN: CPT | Performed by: NURSE PRACTITIONER

## 2022-12-14 RX ORDER — PAROXETINE 10 MG/1
10 TABLET, FILM COATED ORAL DAILY
Qty: 90 TABLET | Refills: 1 | Status: SHIPPED | OUTPATIENT
Start: 2022-12-14

## 2022-12-14 RX ORDER — AMLODIPINE BESYLATE 5 MG/1
5 TABLET ORAL DAILY
Qty: 90 TABLET | Refills: 1 | Status: SHIPPED | OUTPATIENT
Start: 2022-12-14

## 2022-12-14 RX ORDER — PRAVASTATIN SODIUM 20 MG
20 TABLET ORAL NIGHTLY
Qty: 90 TABLET | Refills: 1 | Status: SHIPPED | OUTPATIENT
Start: 2022-12-14

## 2022-12-14 RX ORDER — LEVOTHYROXINE SODIUM 0.05 MG/1
50 TABLET ORAL
Qty: 90 TABLET | Refills: 1 | Status: SHIPPED | OUTPATIENT
Start: 2022-12-14 | End: 2022-12-15 | Stop reason: DRUGHIGH

## 2022-12-14 RX ORDER — HYDROCHLOROTHIAZIDE 12.5 MG/1
12.5 TABLET ORAL DAILY
Qty: 90 TABLET | Refills: 1 | Status: SHIPPED | OUTPATIENT
Start: 2022-12-14

## 2022-12-14 NOTE — PROGRESS NOTES
Chief Complaint  Hypothyroidism (Follow up)    Subjective          Ana Plasencia is a 62 y.o. female who presents to [facility]    History of Present Illness    Hypertension-well-controlled with medication.  Patient has lost 4 pounds.    Hypothyroidism-patient reports compliance with medication however experiences fatigue intermittently.    Anxiety well controlled with medication.    PHQ-2 Total Score: 0   PHQ-9 Total Score: 0        Review of Systems   Constitutional: Positive for fatigue. Negative for chills and fever.   Respiratory: Negative for cough and shortness of breath.    Cardiovascular: Negative for chest pain and palpitations.   Gastrointestinal: Negative for constipation, diarrhea, nausea and vomiting.   Musculoskeletal: Negative for back pain and neck pain.   Skin: Negative for rash.        Skin lesion   Neurological: Negative for dizziness and headaches.   Psychiatric/Behavioral: The patient is nervous/anxious.           Medical History: has a past medical history of Allergic rhinitis, Anxiety, Arthritis, Back pain, Depression, Disease of thyroid gland, Essential hypertension, Glaucoma (05/20/2021), Gout, Hypothyroidism, Memory loss, Migraine headache, Night sweats, and Post-menopausal.     Surgical History: has a past surgical history that includes Tonsillectomy and [place] tooth extraction.     Family History: family history includes Alzheimer's disease in her mother; Diabetes in her father and sister; Heart disease in her father; Hypertension in her mother; Stroke in her maternal grandmother.     Social History: reports that she has never smoked. She has never used smokeless tobacco. She reports that she does not drink alcohol and does not use drugs.    Allergies: Patient has no known allergies.      Health Maintenance Due   Topic Date Due   • TDAP/TD VACCINES (1 - Tdap) Never done   • ZOSTER VACCINE (1 of 2) Never done   • COVID-19 Vaccine (3 - Booster for  Moderna series) 04/06/2021   • HEPATITIS C SCREENING  Never done   • PAP SMEAR  Never done   • INFLUENZA VACCINE  08/01/2022            Current Outpatient Medications:   •  amLODIPine (NORVASC) 5 MG tablet, Take 1 tablet by mouth Daily. for blood pressure, Disp: 90 tablet, Rfl: 1  •  Cholecalciferol 50 MCG (2000 UT) tablet, Take 1 tablet by mouth Daily., Disp: 90 tablet, Rfl: 1  •  clobetasol (TEMOVATE) 0.05 % gel, Apply 1 application topically to the appropriate area as directed Every 12 (Twelve) Hours., Disp: 30 g, Rfl: 1  •  hydroCHLOROthiazide (HYDRODIURIL) 12.5 MG tablet, Take 1 tablet by mouth Daily., Disp: 90 tablet, Rfl: 1  •  levothyroxine (SYNTHROID, LEVOTHROID) 50 MCG tablet, Take 1 tablet by mouth Every Morning., Disp: 90 tablet, Rfl: 1  •  Loratadine 10 MG capsule, Take  by mouth., Disp: , Rfl:   •  PARoxetine (PAXIL) 10 MG tablet, Take 1 tablet by mouth Daily., Disp: 90 tablet, Rfl: 1  •  pravastatin (PRAVACHOL) 20 MG tablet, Take 1 tablet by mouth Every Night., Disp: 90 tablet, Rfl: 1  •  bimatoprost (LUMIGAN) 0.01 % ophthalmic drops, 1 drop Every Night., Disp: , Rfl:   •  ketotifen (ZADITOR) 0.025 % ophthalmic solution, , Disp: , Rfl:   •  travoprost, KAREN free, (TRAVATAN) 0.004 % solution ophthalmic solution, , Disp: , Rfl:       Immunization History   Administered Date(s) Administered   • COVID-19 (MODERNA) 1st, 2nd, 3rd Dose Only 01/12/2021, 02/09/2021   • DTaP 09/04/2019   • FluLaval/Fluzone >6mos 11/22/2021   • Fluzone Quad >6mos (Multi-dose) 10/01/2020         Objective       Vitals:    12/14/22 1327   BP: 123/64   Pulse: 71   Temp: 98.7 °F (37.1 °C)   TempSrc: Temporal   SpO2: 97%   Weight: 95.6 kg (210 lb 12.8 oz)      Body mass index is 36.18 kg/m².   Wt Readings from Last 3 Encounters:   12/14/22 95.6 kg (210 lb 12.8 oz)   07/28/22 97.1 kg (214 lb)   06/20/22 97.1 kg (214 lb)      BP Readings from Last 3 Encounters:   12/14/22 123/64   06/20/22 127/63   01/22/22 177/75        Physical  Exam  Vitals reviewed.   Constitutional:       Appearance: Normal appearance. She is well-developed.   HENT:      Head: Normocephalic and atraumatic.   Eyes:      Conjunctiva/sclera: Conjunctivae normal.      Pupils: Pupils are equal, round, and reactive to light.   Cardiovascular:      Rate and Rhythm: Normal rate and regular rhythm.      Heart sounds: Normal heart sounds. No murmur heard.  Pulmonary:      Effort: Pulmonary effort is normal.      Breath sounds: Normal breath sounds. No wheezing or rhonchi.   Abdominal:      General: Bowel sounds are normal. There is no distension.      Palpations: Abdomen is soft.      Tenderness: There is no abdominal tenderness.   Skin:     General: Skin is warm and dry.      Comments: 4 mm raised flesh-colored skin papule to left chest.    Right lower posterior back above the bra line there is a quarter sized seborrheic keratoses.   Neurological:      Mental Status: She is alert and oriented to person, place, and time.   Psychiatric:         Mood and Affect: Mood and affect normal.         Behavior: Behavior normal.         Thought Content: Thought content normal.         Judgment: Judgment normal.             Result Review :       Common labs    Common Labs 6/21/22 6/21/22 6/21/22 6/21/22    0929 0929 0929 0929   Glucose   116 (A)    BUN   15    Creatinine   0.88    Sodium   141    Potassium   3.8    Chloride   103    Calcium   9.2    Albumin   4.30    Total Bilirubin   0.3    Alkaline Phosphatase   97    AST (SGOT)   19    ALT (SGPT)   20    WBC 4.49      Hemoglobin 12.3      Hematocrit 35.5      Platelets 221      Total Cholesterol  180     Triglycerides  132     HDL Cholesterol  51     LDL Cholesterol   106 (A)     Hemoglobin A1C    5.60   (A) Abnormal value                             Assessment and Plan        Diagnoses and all orders for this visit:    1. Hypothyroidism, unspecified type (Primary)  -     levothyroxine (SYNTHROID, LEVOTHROID) 50 MCG tablet; Take 1 tablet  by mouth Every Morning.  Dispense: 90 tablet; Refill: 1  -     TSH    2. Essential hypertension  -     amLODIPine (NORVASC) 5 MG tablet; Take 1 tablet by mouth Daily. for blood pressure  Dispense: 90 tablet; Refill: 1  -     hydroCHLOROthiazide (HYDRODIURIL) 12.5 MG tablet; Take 1 tablet by mouth Daily.  Dispense: 90 tablet; Refill: 1    3. Recurrent major depressive disorder, in partial remission (HCC)  -     PARoxetine (PAXIL) 10 MG tablet; Take 1 tablet by mouth Daily.  Dispense: 90 tablet; Refill: 1    4. Hyperlipidemia, unspecified hyperlipidemia type  -     pravastatin (PRAVACHOL) 20 MG tablet; Take 1 tablet by mouth Every Night.  Dispense: 90 tablet; Refill: 1  -     Comprehensive Metabolic Panel  -     Lipid Panel    5. Vitamin D deficiency  -     Vitamin D,25-Hydroxy    6. Colon cancer screening  -     Cologuard - Stool, Per Rectum; Future    7. Skin lesion of chest wall  -     Ambulatory Referral to Dermatology      The patient is asked to make an attempt to improve diet and exercise patterns to aid in medical management of these diagnoses.    Follow Up     Return in about 6 months (around 6/14/2023) for Next scheduled follow up.    Patient was given instructions and counseling regarding her condition or for health maintenance advice. Please see specific information pulled into the AVS if appropriate.     JAXSON Schaffer    Answers for HPI/ROS submitted by the patient on 12/14/2022  Please describe your symptoms.: No symptoms, just routine checkup.  Blood work? To check thyroid levels, etc.  Have you had these symptoms before?: Yes  How long have you been having these symptoms?: Greater than 2 weeks  Please list any medications you are currently taking for this condition.: Levothyroxine and other meds for blood pressure and cholesterol, etc  What is the primary reason for your visit?: Other

## 2022-12-15 DIAGNOSIS — E03.9 HYPOTHYROIDISM, UNSPECIFIED TYPE: Primary | ICD-10-CM

## 2022-12-15 LAB — 25(OH)D3 SERPL-MCNC: 57.8 NG/ML (ref 30–100)

## 2022-12-15 RX ORDER — LEVOTHYROXINE SODIUM 0.07 MG/1
75 TABLET ORAL DAILY
Qty: 60 TABLET | Refills: 0 | Status: SHIPPED | OUTPATIENT
Start: 2022-12-15 | End: 2023-02-28

## 2023-01-05 DIAGNOSIS — E55.9 VITAMIN D DEFICIENCY: ICD-10-CM

## 2023-01-05 RX ORDER — CHOLECALCIFEROL (VITAMIN D3) 125 MCG
CAPSULE ORAL
Qty: 90 TABLET | Refills: 0 | Status: SHIPPED | OUTPATIENT
Start: 2023-01-05

## 2023-02-16 NOTE — PROGRESS NOTES
Chief Complaint    Annual Exam  Hypothyroidism and Annual Exam    Subjective          Ana Plasencia is a 63 y.o. female who presents to [facility]    History of Present Illness    Hypertension - Bp controlled. Doesn't check at home unless she is feeling bad. Taking all her bp medicines as ordered.     Hypothyroidism - Energy level is okay, she takes some nutrisystem vitamins that help. Takes medication every morning on an empty stomach.     Allergies - Not doing too bad, normally just takes Claritin. Claritin helps her allergies.     Hyperlipidemia - Takes medication every night. No myalgias noted.     Depression - Takes paxil everyday. Helps with her depression symptoms. Has more good days than bad days.     Annual Exam    Last dental exam: goes every 3 months.   Last eye exam: wears corrective glasses. Goes regularly.       PHQ-2 Total Score:     PHQ-9 Total Score:          Review of Systems   Constitutional:  Negative for chills, fatigue and fever.   Respiratory:  Negative for cough and shortness of breath.    Cardiovascular:  Negative for chest pain and palpitations.   Gastrointestinal:  Negative for constipation, diarrhea, nausea and vomiting.   Musculoskeletal:  Negative for back pain and neck pain.   Skin:  Negative for rash.   Allergic/Immunologic: Positive for environmental allergies.   Neurological:  Negative for dizziness and headaches.   Psychiatric/Behavioral:  Negative for suicidal ideas. The patient is not nervous/anxious.         Medical History: has a past medical history of Allergic rhinitis, Anxiety, Arthritis, Back pain, Depression, Disease of thyroid gland, Essential hypertension, Glaucoma (05/20/2021), Gout, Hypothyroidism, Memory loss, Migraine headache, Night sweats, and Post-menopausal.     Surgical History: has a past surgical history that includes Tonsillectomy and [place] tooth extraction.     Family History: family history includes Alzheimer's disease  in her mother; Diabetes in her father and sister; Heart disease in her father; Hypertension in her mother; Stroke in her maternal grandmother.     Social History: reports that she has never smoked. She has never used smokeless tobacco. She reports that she does not drink alcohol and does not use drugs.    Allergies: Patient has no known allergies.      Health Maintenance Due   Topic Date Due    TDAP/TD VACCINES (1 - Tdap) Never done    ZOSTER VACCINE (1 of 2) Never done    COVID-19 Vaccine (3 - Moderna series) 04/06/2021    PAP SMEAR  Never done            Current Outpatient Medications:     amLODIPine (NORVASC) 5 MG tablet, Take 1 tablet by mouth Daily. for blood pressure, Disp: 90 tablet, Rfl: 1    bimatoprost (LUMIGAN) 0.01 % ophthalmic drops, 1 drop Every Night., Disp: , Rfl:     Cholecalciferol (Vitamin D3) 50 MCG (2000 UT) tablet, Take 1 tablet by mouth Daily., Disp: 90 tablet, Rfl: 0    hydroCHLOROthiazide (HYDRODIURIL) 12.5 MG tablet, Take 1 tablet by mouth Daily., Disp: 90 tablet, Rfl: 1    levothyroxine (SYNTHROID, LEVOTHROID) 75 MCG tablet, TAKE 1 TABLET BY MOUTH ONCE DAILY FOR THYROID, Disp: 60 tablet, Rfl: 0    Loratadine 10 MG capsule, Take  by mouth., Disp: , Rfl:     PARoxetine (PAXIL) 10 MG tablet, Take 1 tablet by mouth Daily., Disp: 90 tablet, Rfl: 1    pravastatin (PRAVACHOL) 20 MG tablet, Take 1 tablet by mouth Every Night., Disp: 90 tablet, Rfl: 1    clobetasol (TEMOVATE) 0.05 % gel, Apply 1 application topically to the appropriate area as directed Every 12 (Twelve) Hours. (Patient not taking: Reported on 6/8/2023), Disp: 30 g, Rfl: 1      Immunization History   Administered Date(s) Administered    COVID-19 (MODERNA) 1st,2nd,3rd Dose Monovalent 01/12/2021, 02/09/2021    DTaP 09/04/2019    FluLaval/Fluzone >6mos 11/22/2021    Fluzone Quad >6mos (Multi-dose) 10/01/2020         Objective       Vitals:    06/08/23 1114   BP: 119/79   Pulse: 79   Temp: 98.8 °F (37.1 °C)   TempSrc: Temporal   SpO2:  96%   Weight: 94.9 kg (209 lb 3.2 oz)      Body mass index is 35.91 kg/m².   Wt Readings from Last 3 Encounters:   06/08/23 94.9 kg (209 lb 3.2 oz)   12/14/22 95.6 kg (210 lb 12.8 oz)   07/28/22 97.1 kg (214 lb)      BP Readings from Last 3 Encounters:   06/08/23 119/79   12/14/22 123/64   06/20/22 127/63        Physical Exam  Vitals reviewed.   Constitutional:       Appearance: Normal appearance. She is well-developed.   HENT:      Head: Normocephalic and atraumatic.   Eyes:      Conjunctiva/sclera: Conjunctivae normal.      Pupils: Pupils are equal, round, and reactive to light.   Neck:      Thyroid: No thyroid mass, thyromegaly or thyroid tenderness.      Vascular: No carotid bruit.   Cardiovascular:      Rate and Rhythm: Normal rate and regular rhythm.      Heart sounds: Normal heart sounds. No murmur heard.  Pulmonary:      Effort: Pulmonary effort is normal.      Breath sounds: Normal breath sounds. No wheezing or rhonchi.   Abdominal:      General: Bowel sounds are normal. There is no distension.      Palpations: Abdomen is soft.      Tenderness: There is no abdominal tenderness.   Skin:     General: Skin is warm and dry.   Neurological:      Mental Status: She is alert and oriented to person, place, and time.   Psychiatric:         Mood and Affect: Mood and affect normal.         Behavior: Behavior normal.         Thought Content: Thought content normal.         Judgment: Judgment normal.           Result Review :       Common labs          6/21/2022    09:29 12/14/2022    13:58   Common Labs   Glucose 116  110    BUN 15  14    Creatinine 0.88  0.95    Sodium 141  143    Potassium 3.8  4.7    Chloride 103  102    Calcium 9.2  10.0    Albumin 4.30  5.00    Total Bilirubin 0.3  0.4    Alkaline Phosphatase 97  116    AST (SGOT) 19  22    ALT (SGPT) 20  23    WBC 4.49     Hemoglobin 12.3     Hematocrit 35.5     Platelets 221     Total Cholesterol 180  197    Triglycerides 132  119    HDL Cholesterol 51  55     LDL Cholesterol  106  121    Hemoglobin A1C 5.60                        Assessment and Plan        Diagnoses and all orders for this visit:    1. Annual physical exam (Primary)  -     Lipid Panel  -     Comprehensive Metabolic Panel  -     CBC (No Diff)  -     Hemoglobin A1c    2. Screening for lipid disorders  -     Lipid Panel  -     Comprehensive Metabolic Panel    3. Hypothyroidism, unspecified type  -     TSH    4. Essential hypertension  -     CBC (No Diff)  -     amLODIPine (NORVASC) 5 MG tablet; Take 1 tablet by mouth Daily. for blood pressure  Dispense: 90 tablet; Refill: 1  -     hydroCHLOROthiazide (HYDRODIURIL) 12.5 MG tablet; Take 1 tablet by mouth Daily.  Dispense: 90 tablet; Refill: 1    5. Hyperlipidemia, unspecified hyperlipidemia type  -     pravastatin (PRAVACHOL) 20 MG tablet; Take 1 tablet by mouth Every Night.  Dispense: 90 tablet; Refill: 1    6. Recurrent major depressive disorder, in partial remission  -     PARoxetine (PAXIL) 10 MG tablet; Take 1 tablet by mouth Daily.  Dispense: 90 tablet; Refill: 1    7. Vitamin D deficiency  -     Cholecalciferol (Vitamin D3) 50 MCG (2000 UT) tablet; Take 1 tablet by mouth Daily.  Dispense: 90 tablet; Refill: 0    8. Diabetes mellitus screening  -     Hemoglobin A1c    9. Visit for screening mammogram  -     Mammo Screening Digital Tomosynthesis Bilateral With CAD; Future      Patient advised to monitor blood pressure at home and journal readings.  Patient informed that a blood pressure reading at home of more than 130/80 is considered hypertension.  For readings greater than 140/90 or higher patient is advised to follow-up in the office with readings for management.  Patient advised to limit sodium intake.     Follow Up     Return in about 1 year (around 6/8/2024) for Next scheduled follow up.    Updated annual wellness visit checklist.  Immunizations discussed.  Screening discussed and/or ordered.  Recommend yearly dental and eye exams. Also  discussed monitoring of blood pressure and lipids.        Patient was given instructions and counseling regarding her condition or for health maintenance advice. Please see specific information pulled into the AVS if appropriate.     JAXSON Schaffer

## 2023-02-28 RX ORDER — LEVOTHYROXINE SODIUM 0.07 MG/1
TABLET ORAL
Qty: 60 TABLET | Refills: 0 | Status: SHIPPED | OUTPATIENT
Start: 2023-02-28

## 2023-04-14 DIAGNOSIS — E55.9 VITAMIN D DEFICIENCY: ICD-10-CM

## 2023-04-17 RX ORDER — CHOLECALCIFEROL (VITAMIN D3) 125 MCG
CAPSULE ORAL
Qty: 90 TABLET | Refills: 0 | Status: SHIPPED | OUTPATIENT
Start: 2023-04-17

## 2023-05-11 RX ORDER — LEVOTHYROXINE SODIUM 0.07 MG/1
TABLET ORAL
Qty: 60 TABLET | Refills: 0 | Status: SHIPPED | OUTPATIENT
Start: 2023-05-11

## 2023-06-08 ENCOUNTER — OFFICE VISIT (OUTPATIENT)
Dept: FAMILY MEDICINE CLINIC | Facility: CLINIC | Age: 63
End: 2023-06-08
Payer: COMMERCIAL

## 2023-06-08 VITALS
TEMPERATURE: 98.8 F | WEIGHT: 209.2 LBS | SYSTOLIC BLOOD PRESSURE: 119 MMHG | BODY MASS INDEX: 35.91 KG/M2 | OXYGEN SATURATION: 96 % | DIASTOLIC BLOOD PRESSURE: 79 MMHG | HEART RATE: 79 BPM

## 2023-06-08 DIAGNOSIS — Z12.31 VISIT FOR SCREENING MAMMOGRAM: ICD-10-CM

## 2023-06-08 DIAGNOSIS — F33.41 RECURRENT MAJOR DEPRESSIVE DISORDER, IN PARTIAL REMISSION: ICD-10-CM

## 2023-06-08 DIAGNOSIS — E03.9 HYPOTHYROIDISM, UNSPECIFIED TYPE: ICD-10-CM

## 2023-06-08 DIAGNOSIS — Z13.220 SCREENING FOR LIPID DISORDERS: ICD-10-CM

## 2023-06-08 DIAGNOSIS — Z13.1 DIABETES MELLITUS SCREENING: ICD-10-CM

## 2023-06-08 DIAGNOSIS — Z00.00 ANNUAL PHYSICAL EXAM: Primary | ICD-10-CM

## 2023-06-08 DIAGNOSIS — E55.9 VITAMIN D DEFICIENCY: ICD-10-CM

## 2023-06-08 DIAGNOSIS — E78.5 HYPERLIPIDEMIA, UNSPECIFIED HYPERLIPIDEMIA TYPE: ICD-10-CM

## 2023-06-08 DIAGNOSIS — I10 ESSENTIAL HYPERTENSION: ICD-10-CM

## 2023-06-08 LAB
ALBUMIN SERPL-MCNC: 4.3 G/DL (ref 3.5–5.2)
ALBUMIN/GLOB SERPL: 1.5 G/DL
ALP SERPL-CCNC: 106 U/L (ref 39–117)
ALT SERPL W P-5'-P-CCNC: 24 U/L (ref 1–33)
ANION GAP SERPL CALCULATED.3IONS-SCNC: 9.5 MMOL/L (ref 5–15)
AST SERPL-CCNC: 18 U/L (ref 1–32)
BILIRUB SERPL-MCNC: 0.3 MG/DL (ref 0–1.2)
BUN SERPL-MCNC: 16 MG/DL (ref 8–23)
BUN/CREAT SERPL: 16.3 (ref 7–25)
CALCIUM SPEC-SCNC: 10.5 MG/DL (ref 8.6–10.5)
CHLORIDE SERPL-SCNC: 103 MMOL/L (ref 98–107)
CHOLEST SERPL-MCNC: 176 MG/DL (ref 0–200)
CO2 SERPL-SCNC: 28.5 MMOL/L (ref 22–29)
CREAT SERPL-MCNC: 0.98 MG/DL (ref 0.57–1)
DEPRECATED RDW RBC AUTO: 42.4 FL (ref 37–54)
EGFRCR SERPLBLD CKD-EPI 2021: 65.8 ML/MIN/1.73
ERYTHROCYTE [DISTWIDTH] IN BLOOD BY AUTOMATED COUNT: 13.4 % (ref 12.3–15.4)
GLOBULIN UR ELPH-MCNC: 2.8 GM/DL
GLUCOSE SERPL-MCNC: 102 MG/DL (ref 65–99)
HBA1C MFR BLD: 6.2 % (ref 4.8–5.6)
HCT VFR BLD AUTO: 38.5 % (ref 34–46.6)
HDLC SERPL-MCNC: 52 MG/DL (ref 40–60)
HGB BLD-MCNC: 12.9 G/DL (ref 12–15.9)
LDLC SERPL CALC-MCNC: 100 MG/DL (ref 0–100)
LDLC/HDLC SERPL: 1.86 {RATIO}
MCH RBC QN AUTO: 29.2 PG (ref 26.6–33)
MCHC RBC AUTO-ENTMCNC: 33.5 G/DL (ref 31.5–35.7)
MCV RBC AUTO: 87.1 FL (ref 79–97)
PLATELET # BLD AUTO: 238 10*3/MM3 (ref 140–450)
PMV BLD AUTO: 10.9 FL (ref 6–12)
POTASSIUM SERPL-SCNC: 4.3 MMOL/L (ref 3.5–5.2)
PROT SERPL-MCNC: 7.1 G/DL (ref 6–8.5)
RBC # BLD AUTO: 4.42 10*6/MM3 (ref 3.77–5.28)
SODIUM SERPL-SCNC: 141 MMOL/L (ref 136–145)
TRIGL SERPL-MCNC: 136 MG/DL (ref 0–150)
TSH SERPL DL<=0.05 MIU/L-ACNC: 1.58 UIU/ML (ref 0.27–4.2)
VLDLC SERPL-MCNC: 24 MG/DL (ref 5–40)
WBC NRBC COR # BLD: 5.73 10*3/MM3 (ref 3.4–10.8)

## 2023-06-08 PROCEDURE — 80050 GENERAL HEALTH PANEL: CPT | Performed by: NURSE PRACTITIONER

## 2023-06-08 PROCEDURE — 80061 LIPID PANEL: CPT | Performed by: NURSE PRACTITIONER

## 2023-06-08 PROCEDURE — 83036 HEMOGLOBIN GLYCOSYLATED A1C: CPT | Performed by: NURSE PRACTITIONER

## 2023-06-08 RX ORDER — AMLODIPINE BESYLATE 5 MG/1
5 TABLET ORAL DAILY
Qty: 90 TABLET | Refills: 1 | Status: SHIPPED | OUTPATIENT
Start: 2023-06-08

## 2023-06-08 RX ORDER — PRAVASTATIN SODIUM 20 MG
20 TABLET ORAL NIGHTLY
Qty: 90 TABLET | Refills: 1 | Status: SHIPPED | OUTPATIENT
Start: 2023-06-08

## 2023-06-08 RX ORDER — PAROXETINE 10 MG/1
10 TABLET, FILM COATED ORAL DAILY
Qty: 90 TABLET | Refills: 1 | Status: SHIPPED | OUTPATIENT
Start: 2023-06-08

## 2023-06-08 RX ORDER — HYDROCHLOROTHIAZIDE 12.5 MG/1
12.5 TABLET ORAL DAILY
Qty: 90 TABLET | Refills: 1 | Status: SHIPPED | OUTPATIENT
Start: 2023-06-08

## 2023-06-08 RX ORDER — CHOLECALCIFEROL (VITAMIN D3) 125 MCG
1 CAPSULE ORAL DAILY
Qty: 90 TABLET | Refills: 0 | Status: SHIPPED | OUTPATIENT
Start: 2023-06-08

## 2023-06-09 DIAGNOSIS — R73.01 IMPAIRED FASTING GLUCOSE: Primary | ICD-10-CM

## 2023-07-25 RX ORDER — LEVOTHYROXINE SODIUM 0.07 MG/1
TABLET ORAL
Qty: 60 TABLET | Refills: 0 | Status: SHIPPED | OUTPATIENT
Start: 2023-07-25

## 2023-09-22 DIAGNOSIS — Z12.31 VISIT FOR SCREENING MAMMOGRAM: ICD-10-CM

## 2023-09-25 ENCOUNTER — LAB (OUTPATIENT)
Dept: LAB | Facility: HOSPITAL | Age: 63
End: 2023-09-25
Payer: COMMERCIAL

## 2023-09-25 DIAGNOSIS — R73.01 IMPAIRED FASTING GLUCOSE: ICD-10-CM

## 2023-09-25 LAB — HBA1C MFR BLD: 6 % (ref 4.8–5.6)

## 2023-09-25 PROCEDURE — 83036 HEMOGLOBIN GLYCOSYLATED A1C: CPT | Performed by: NURSE PRACTITIONER

## 2023-09-26 DIAGNOSIS — R73.01 IMPAIRED FASTING GLUCOSE: Primary | ICD-10-CM

## 2023-09-27 RX ORDER — LEVOTHYROXINE SODIUM 0.07 MG/1
TABLET ORAL
Qty: 90 TABLET | Refills: 1 | Status: SHIPPED | OUTPATIENT
Start: 2023-09-27

## 2023-10-23 DIAGNOSIS — I10 ESSENTIAL HYPERTENSION: ICD-10-CM

## 2023-10-23 RX ORDER — HYDROCHLOROTHIAZIDE 12.5 MG/1
TABLET ORAL
Qty: 90 TABLET | Refills: 1 | Status: SHIPPED | OUTPATIENT
Start: 2023-10-23

## 2023-10-24 DIAGNOSIS — E55.9 VITAMIN D DEFICIENCY: ICD-10-CM

## 2023-10-24 RX ORDER — CHOLECALCIFEROL (VITAMIN D3) 125 MCG
1 CAPSULE ORAL DAILY
Qty: 90 TABLET | Refills: 0 | Status: SHIPPED | OUTPATIENT
Start: 2023-10-24

## 2023-12-19 DIAGNOSIS — E78.5 HYPERLIPIDEMIA, UNSPECIFIED HYPERLIPIDEMIA TYPE: ICD-10-CM

## 2023-12-19 RX ORDER — PRAVASTATIN SODIUM 20 MG
20 TABLET ORAL EVERY EVENING
Qty: 90 TABLET | Refills: 0 | Status: SHIPPED | OUTPATIENT
Start: 2023-12-19

## 2024-01-29 DIAGNOSIS — I10 ESSENTIAL HYPERTENSION: ICD-10-CM

## 2024-01-29 RX ORDER — AMLODIPINE BESYLATE 5 MG/1
5 TABLET ORAL DAILY
Qty: 90 TABLET | Refills: 0 | Status: SHIPPED | OUTPATIENT
Start: 2024-01-29

## 2024-02-20 DIAGNOSIS — F33.41 RECURRENT MAJOR DEPRESSIVE DISORDER, IN PARTIAL REMISSION: ICD-10-CM

## 2024-02-21 RX ORDER — PAROXETINE 10 MG/1
10 TABLET, FILM COATED ORAL DAILY
Qty: 90 TABLET | Refills: 0 | Status: SHIPPED | OUTPATIENT
Start: 2024-02-21

## 2024-03-07 ENCOUNTER — TELEPHONE (OUTPATIENT)
Dept: FAMILY MEDICINE CLINIC | Facility: CLINIC | Age: 64
End: 2024-03-07
Payer: COMMERCIAL

## 2024-03-07 NOTE — TELEPHONE ENCOUNTER
Patient called stating she has been having dizziness for the past 3 days. She believes it is vertigo from fluid in her ears- stsates she has had vertigo a few years ago and this is what it feels like. Wants to know what to do, please advise.

## 2024-03-13 DIAGNOSIS — E55.9 VITAMIN D DEFICIENCY: ICD-10-CM

## 2024-03-13 RX ORDER — CHOLECALCIFEROL (VITAMIN D3) 125 MCG
1 CAPSULE ORAL DAILY
Qty: 90 TABLET | Refills: 3 | Status: SHIPPED | OUTPATIENT
Start: 2024-03-13

## 2024-03-19 DIAGNOSIS — E78.5 HYPERLIPIDEMIA, UNSPECIFIED HYPERLIPIDEMIA TYPE: ICD-10-CM

## 2024-03-19 RX ORDER — PRAVASTATIN SODIUM 20 MG
20 TABLET ORAL EVERY EVENING
Qty: 90 TABLET | Refills: 0 | Status: SHIPPED | OUTPATIENT
Start: 2024-03-19

## 2024-03-20 ENCOUNTER — OFFICE VISIT (OUTPATIENT)
Dept: FAMILY MEDICINE CLINIC | Facility: CLINIC | Age: 64
End: 2024-03-20
Payer: COMMERCIAL

## 2024-03-20 VITALS
WEIGHT: 218 LBS | DIASTOLIC BLOOD PRESSURE: 67 MMHG | TEMPERATURE: 97.4 F | SYSTOLIC BLOOD PRESSURE: 125 MMHG | OXYGEN SATURATION: 97 % | HEART RATE: 79 BPM | HEIGHT: 64 IN | BODY MASS INDEX: 37.22 KG/M2

## 2024-03-20 DIAGNOSIS — I10 ESSENTIAL HYPERTENSION: Primary | ICD-10-CM

## 2024-03-20 DIAGNOSIS — H61.22 IMPACTED CERUMEN OF LEFT EAR: ICD-10-CM

## 2024-03-20 DIAGNOSIS — H61.21 IMPACTED CERUMEN OF RIGHT EAR: ICD-10-CM

## 2024-03-20 DIAGNOSIS — E03.9 HYPOTHYROIDISM, UNSPECIFIED TYPE: ICD-10-CM

## 2024-03-20 RX ORDER — LEVOTHYROXINE SODIUM 0.07 MG/1
75 TABLET ORAL DAILY
Qty: 90 TABLET | Refills: 0 | Status: SHIPPED | OUTPATIENT
Start: 2024-03-20

## 2024-03-20 NOTE — PROGRESS NOTES
"Chief Complaint  Ear Fullness    Subjective      Ana Plasencia is a 64 y.o. female who presents to [facility]     Same day visit:   Patient comes with b/l ear wax impaction.     Objective   Vital Signs:   Vitals:    03/20/24 1540   BP: 125/67   BP Location: Right arm   Patient Position: Sitting   Cuff Size: Large Adult   Pulse: 79   Temp: 97.4 °F (36.3 °C)   SpO2: 97%   Weight: 98.9 kg (218 lb)   Height: 162.6 cm (64\")     Body mass index is 37.42 kg/m².    Wt Readings from Last 3 Encounters:   03/20/24 98.9 kg (218 lb)   03/07/24 94.8 kg (209 lb)   06/08/23 94.9 kg (209 lb 3.2 oz)     BP Readings from Last 3 Encounters:   03/20/24 125/67   03/07/24 140/91   06/08/23 119/79       Health Maintenance   Topic Date Due    TDAP/TD VACCINES (1 - Tdap) Never done    ZOSTER VACCINE (1 of 2) Never done    PAP SMEAR  Never done    RSV Vaccine - Adults (1 - 1-dose 60+ series) Never done    BMI FOLLOWUP  04/21/2023    INFLUENZA VACCINE  08/01/2023    COVID-19 Vaccine (3 - 2023-24 season) 09/01/2023    ANNUAL PHYSICAL  06/08/2024    LIPID PANEL  06/08/2024    MAMMOGRAM  09/05/2025    COLORECTAL CANCER SCREENING  02/27/2026    HEPATITIS C SCREENING  Completed    Pneumococcal Vaccine 0-64  Aged Out       Physical Exam  Skin:     General: Skin is warm.      Capillary Refill: Capillary refill takes less than 2 seconds.   Neurological:      General: No focal deficit present.      Mental Status: She is alert and oriented to person, place, and time.             Ear Cerumen Removal    Date/Time: 3/20/2024 3:48 PM    Performed by: Jacob Ayala MD  Authorized by: Jacob Ayala MD    Anesthesia:  Local Anesthetic: none  Location details: left ear and right ear  Patient tolerance: patient tolerated the procedure well with no immediate complications  Procedure type: irrigation   Sedation:  Patient sedated: no         Instrumentation spatula use.    Assessment & Plan  Essential " hypertension  Hypertension is stable and controlled  Continue current treatment regimen.  Blood pressure will be reassessed in 6 months.  Impacted cerumen of left ear    Impacted cerumen of right ear      Orders Placed This Encounter   Procedures    Ear Cerumen Removal    Ear Cerumen Removal             Class 2 Severe Obesity (BMI >=35 and <=39.9). Obesity-related health conditions include the following: dyslipidemias. Obesity is unchanged. BMI is is above average; BMI management plan is completed. We discussed portion control and increasing exercise.       I spent 20 minutes caring for Ana on this date of service. This time includes time spent by me in the following activities:preparing for the visit, reviewing tests, obtaining and/or reviewing a separately obtained history, performing a medically appropriate examination and/or evaluation , counseling and educating the patient/family/caregiver, ordering medications, tests, or procedures, referring and communicating with other health care professionals , documenting information in the medical record, independently interpreting results and communicating that information with the patient/family/caregiver, and care coordination  FOLLOW UP  No follow-ups on file.  Patient was given instructions and counseling regarding her condition or for health maintenance advice. Please see specific information pulled into the AVS if appropriate.       Jacob Sullivan MD  03/20/24  15:52 EDT    CURRENT & DISCONTINUED MEDICATIONS  Current Outpatient Medications   Medication Instructions    amLODIPine (NORVASC) 5 mg, Oral, Daily, for blood pressure    bimatoprost (LUMIGAN) 0.01 % ophthalmic drops 1 drop, Nightly    clobetasol (TEMOVATE) 0.05 % gel 1 application , Topical, Every 12 Hours Scheduled    hydroCHLOROthiazide (HYDRODIURIL) 12.5 MG tablet TAKE 1 TABLET BY MOUTH ONCE DAILY FOR FLUID    levothyroxine (SYNTHROID, LEVOTHROID) 75 MCG tablet TAKE 1 TABLET BY MOUTH  EVERY MORNING TAKE ON EMPTY STOMACH FOR THYROID    Loratadine 10 MG capsule Oral    meclizine (ANTIVERT) 25 mg, Oral, 4 Times Daily PRN    PARoxetine (PAXIL) 10 mg, Oral, Daily    pravastatin (PRAVACHOL) 20 mg, Oral, Every Evening, for cholesterol    Vitamin D3 50 mcg, Oral, Daily       There are no discontinued medications.

## 2024-04-30 NOTE — PROGRESS NOTES
Chief Complaint    Annual Exam  Hypothyroidism (Follow up), Annual Exam, and Sleep Apnea (Would like to discuss new machine)    Subjective          Ana Plasencia is a 64 y.o. female who presents to [facility]    Annual Exam    History of Present Illness  The patient presents for evaluation of multiple medical concerns.    The patient has been utilizing a CPAP machine for an extended period and is contemplating an upgrade. Her last sleep study was conducted approximately 10 to 15 years ago. She reports persistent fatigue and is on a regimen of vitamins, vitamin D3, amlodipine, and hydrochlorothiazide.    The patient has been making efforts to maintain a healthy diet, however, she has been unsuccessful in losing weight. Her physical activity has decreased due to her [facility]. She attempts to abstain from eating until late-night awakenings, opting for orange juice in the morning. She has recently initiated morning walks with a friend approximately 2 to 3 times per week.    The patient's last dental examination yielded satisfactory results.    Supplemental Information  She had glaucoma.     She has an appointment to get her Pap smear next month.     She denies any lack of interest or pleasure in doing things in the past 2 weeks. She denies feeling down, depressed, or hopeless.          PHQ-2 Total Score: 0   PHQ-9 Total Score: 0        Review of Systems   Constitutional:  Negative for chills, fatigue and fever.   Respiratory:  Negative for cough and shortness of breath.    Cardiovascular:  Negative for chest pain and palpitations.   Gastrointestinal:  Negative for constipation, diarrhea, nausea and vomiting.   Musculoskeletal:  Negative for back pain and neck pain.   Skin:  Negative for rash.   Neurological:  Negative for dizziness and headaches.          Medical History: has a past medical history of Allergic rhinitis, Anxiety, Arthritis, Back pain, Depression, Disease of thyroid  gland, Essential hypertension, Glaucoma (05/20/2021), Gout, Hypothyroidism, Memory loss, Migraine headache, Night sweats, and Post-menopausal.     Surgical History: has a past surgical history that includes Tonsillectomy and [place] tooth extraction.     Family History: family history includes Alzheimer's disease in her mother; Diabetes in her father and sister; Heart disease in her father; Hypertension in her mother; Stroke in her maternal grandmother.     Social History: reports that she has never smoked. She has never used smokeless tobacco. She reports that she does not drink alcohol and does not use drugs.    Allergies: Patient has no known allergies.      Health Maintenance Due   Topic Date Due    TDAP/TD VACCINES (1 - Tdap) Never done    ZOSTER VACCINE (1 of 2) Never done    PAP SMEAR  Never done    RSV Vaccine - Adults (1 - 1-dose 60+ series) Never done    COVID-19 Vaccine (3 - 2023-24 season) 09/01/2023    ANNUAL PHYSICAL  06/08/2024    LIPID PANEL  06/08/2024            Current Outpatient Medications:     amLODIPine (NORVASC) 5 MG tablet, Take 1 tablet by mouth Daily. for blood pressure, Disp: 90 tablet, Rfl: 1    bimatoprost (LUMIGAN) 0.03 % ophthalmic drops, , Disp: , Rfl:     Cholecalciferol (Vitamin D3) 50 MCG (2000 UT) tablet, TAKE 1 TABLET BY MOUTH ONCE DAILY, Disp: 90 tablet, Rfl: 3    hydroCHLOROthiazide 12.5 MG tablet, Take 1 tablet by mouth Daily., Disp: 90 tablet, Rfl: 1    levothyroxine (SYNTHROID, LEVOTHROID) 75 MCG tablet, Take 1 tablet by mouth Daily., Disp: 90 tablet, Rfl: 1    Loratadine 10 MG capsule, Take  by mouth., Disp: , Rfl:     PARoxetine (PAXIL) 10 MG tablet, Take 1 tablet by mouth Daily., Disp: 90 tablet, Rfl: 3    pravastatin (PRAVACHOL) 20 MG tablet, Take 1 tablet by mouth Every Evening. for cholesterol, Disp: 90 tablet, Rfl: 1    clobetasol (TEMOVATE) 0.05 % gel, Apply 1 application topically to the appropriate area as directed Every 12 (Twelve) Hours. (Patient not taking:  "Reported on 6/10/2024), Disp: 30 g, Rfl: 1    Tirzepatide-Weight Management (ZEPBOUND) 2.5 MG/0.5ML solution auto-injector, Inject 0.5 mL under the skin into the appropriate area as directed 1 (One) Time Per Week., Disp: 2 mL, Rfl: 1      Immunization History   Administered Date(s) Administered    COVID-19 (MODERNA) 1st,2nd,3rd Dose Monovalent 01/12/2021, 02/09/2021    DTaP 09/04/2019    Fluzone (or Fluarix & Flulaval for VFC) >6mos 11/22/2021    Fluzone Quad >6mos (Multi-dose) 10/01/2020         Objective       Vitals:    06/10/24 1132   BP: 119/77   Pulse: 75   Temp: 99.9 °F (37.7 °C)   TempSrc: Temporal   SpO2: 99%   Weight: 99.3 kg (219 lb)   Height: 162.6 cm (64.02\")      Body mass index is 37.57 kg/m².   Wt Readings from Last 3 Encounters:   06/10/24 99.3 kg (219 lb)   03/20/24 98.9 kg (218 lb)   03/07/24 94.8 kg (209 lb)      BP Readings from Last 3 Encounters:   06/10/24 119/77   03/20/24 125/67   03/07/24 140/91                 Physical Exam  Vitals reviewed.   Constitutional:       Appearance: Normal appearance. She is well-developed.   HENT:      Head: Normocephalic and atraumatic.   Eyes:      Conjunctiva/sclera: Conjunctivae normal.      Pupils: Pupils are equal, round, and reactive to light.   Cardiovascular:      Rate and Rhythm: Normal rate and regular rhythm.      Heart sounds: Normal heart sounds. No murmur heard.  Pulmonary:      Effort: Pulmonary effort is normal.      Breath sounds: Normal breath sounds. No wheezing or rhonchi.   Abdominal:      General: Bowel sounds are normal. There is no distension.      Palpations: Abdomen is soft.      Tenderness: There is no abdominal tenderness.   Skin:     General: Skin is warm and dry.   Neurological:      Mental Status: She is alert and oriented to person, place, and time.   Psychiatric:         Mood and Affect: Mood and affect normal.         Behavior: Behavior normal.         Thought Content: Thought content normal.         Judgment: Judgment normal. "     Physical Exam  Lungs have a good sound.      Result Review :       Common labs          9/25/2023    14:32 6/6/2024    12:04   Common Labs   Hemoglobin A1C 6.00  6.20      Results  Laboratory Studies  Last A1c was 6.2.                 Assessment and Plan      Assessment & Plan  1. Sleep apnea.  A referral will be made to the sleep center for a repeat sleep study.    2. Weight management.  The patient's blood pressure readings are within the normal range. Her thyroid function was satisfactory a year ago, and her last A1c was 6.2, indicating impaired fasting. A comprehensive panel of labs will be ordered. A prescription for Zepbound will be sent to her pharmacy. Dietary recommendations include a low-carb, low-sugar, low-fat, and low-acid diet.    Follow-up  A follow-up visit is scheduled for 1 year from now.    Diagnoses and all orders for this visit:    1. Annual physical exam (Primary)    2. Essential hypertension  -     CBC (No Diff)  -     amLODIPine (NORVASC) 5 MG tablet; Take 1 tablet by mouth Daily. for blood pressure  Dispense: 90 tablet; Refill: 1  -     hydroCHLOROthiazide 12.5 MG tablet; Take 1 tablet by mouth Daily.  Dispense: 90 tablet; Refill: 1    3. Hypothyroidism, unspecified type  -     TSH  -     levothyroxine (SYNTHROID, LEVOTHROID) 75 MCG tablet; Take 1 tablet by mouth Daily.  Dispense: 90 tablet; Refill: 1    4. Screening for lipid disorders  -     Lipid Panel  -     Comprehensive Metabolic Panel    5. Obstructive sleep apnea syndrome  -     Ambulatory Referral to Sleep Medicine    6. Impaired fasting glucose  Comments:  last hgba1c was 6.2.    7. Hyperlipidemia, unspecified hyperlipidemia type  -     pravastatin (PRAVACHOL) 20 MG tablet; Take 1 tablet by mouth Every Evening. for cholesterol  Dispense: 90 tablet; Refill: 1    8. Class 2 severe obesity due to excess calories with serious comorbidity and body mass index (BMI) of 37.0 to 37.9 in adult  -     Tirzepatide-Weight Management  (ZEPBOUND) 2.5 MG/0.5ML solution auto-injector; Inject 0.5 mL under the skin into the appropriate area as directed 1 (One) Time Per Week.  Dispense: 2 mL; Refill: 1    9. Recurrent major depressive disorder, in partial remission  -     PARoxetine (PAXIL) 10 MG tablet; Take 1 tablet by mouth Daily.  Dispense: 90 tablet; Refill: 3    10. Vitamin D deficiency  -     Vitamin D,25-Hydroxy          Follow Up     Return in about 1 year (around 6/10/2025) for Annual physical.    Updated annual wellness visit checklist.  Immunizations discussed.  Screening discussed and/or ordered.  Recommend yearly dental and eye exams. Also discussed monitoring of blood pressure and lipids.      Patient was given instructions and counseling regarding her condition or for health maintenance advice. Please see specific information pulled into the AVS if appropriate.     Patient or patient representative verbalized consent for the use of Ambient Listening during the visit with  JAXSON Schaffer for chart documentation. 6/10/2024  12:01 EDT    JAXSON Schaffer

## 2024-05-02 DIAGNOSIS — I10 ESSENTIAL HYPERTENSION: ICD-10-CM

## 2024-05-02 RX ORDER — AMLODIPINE BESYLATE 5 MG/1
5 TABLET ORAL DAILY
Qty: 90 TABLET | Refills: 0 | Status: SHIPPED | OUTPATIENT
Start: 2024-05-02

## 2024-05-28 DIAGNOSIS — F33.41 RECURRENT MAJOR DEPRESSIVE DISORDER, IN PARTIAL REMISSION: ICD-10-CM

## 2024-05-28 RX ORDER — PAROXETINE 10 MG/1
10 TABLET, FILM COATED ORAL DAILY
Qty: 90 TABLET | Refills: 0 | Status: SHIPPED | OUTPATIENT
Start: 2024-05-28

## 2024-06-06 ENCOUNTER — LAB (OUTPATIENT)
Dept: LAB | Facility: HOSPITAL | Age: 64
End: 2024-06-06
Payer: COMMERCIAL

## 2024-06-06 DIAGNOSIS — R73.01 IMPAIRED FASTING GLUCOSE: ICD-10-CM

## 2024-06-06 LAB — HBA1C MFR BLD: 6.2 % (ref 4.8–5.6)

## 2024-06-06 PROCEDURE — 83036 HEMOGLOBIN GLYCOSYLATED A1C: CPT

## 2024-06-10 ENCOUNTER — OFFICE VISIT (OUTPATIENT)
Dept: FAMILY MEDICINE CLINIC | Facility: CLINIC | Age: 64
End: 2024-06-10
Payer: COMMERCIAL

## 2024-06-10 VITALS
HEIGHT: 64 IN | BODY MASS INDEX: 37.39 KG/M2 | DIASTOLIC BLOOD PRESSURE: 77 MMHG | TEMPERATURE: 99.9 F | WEIGHT: 219 LBS | HEART RATE: 75 BPM | SYSTOLIC BLOOD PRESSURE: 119 MMHG | OXYGEN SATURATION: 99 %

## 2024-06-10 DIAGNOSIS — E03.9 HYPOTHYROIDISM, UNSPECIFIED TYPE: ICD-10-CM

## 2024-06-10 DIAGNOSIS — E66.01 CLASS 2 SEVERE OBESITY DUE TO EXCESS CALORIES WITH SERIOUS COMORBIDITY AND BODY MASS INDEX (BMI) OF 37.0 TO 37.9 IN ADULT: ICD-10-CM

## 2024-06-10 DIAGNOSIS — I10 ESSENTIAL HYPERTENSION: ICD-10-CM

## 2024-06-10 DIAGNOSIS — R73.01 IMPAIRED FASTING GLUCOSE: ICD-10-CM

## 2024-06-10 DIAGNOSIS — E55.9 VITAMIN D DEFICIENCY: ICD-10-CM

## 2024-06-10 DIAGNOSIS — Z00.00 ANNUAL PHYSICAL EXAM: Primary | ICD-10-CM

## 2024-06-10 DIAGNOSIS — Z13.220 SCREENING FOR LIPID DISORDERS: ICD-10-CM

## 2024-06-10 DIAGNOSIS — G47.33 OBSTRUCTIVE SLEEP APNEA SYNDROME: ICD-10-CM

## 2024-06-10 DIAGNOSIS — F33.41 RECURRENT MAJOR DEPRESSIVE DISORDER, IN PARTIAL REMISSION: ICD-10-CM

## 2024-06-10 DIAGNOSIS — E78.5 HYPERLIPIDEMIA, UNSPECIFIED HYPERLIPIDEMIA TYPE: ICD-10-CM

## 2024-06-10 LAB
25(OH)D3 SERPL-MCNC: 50.1 NG/ML (ref 30–100)
ALBUMIN SERPL-MCNC: 4.3 G/DL (ref 3.5–5.2)
ALBUMIN/GLOB SERPL: 1.5 G/DL
ALP SERPL-CCNC: 104 U/L (ref 39–117)
ALT SERPL W P-5'-P-CCNC: 26 U/L (ref 1–33)
ANION GAP SERPL CALCULATED.3IONS-SCNC: 15.8 MMOL/L (ref 5–15)
AST SERPL-CCNC: 21 U/L (ref 1–32)
BILIRUB SERPL-MCNC: 0.2 MG/DL (ref 0–1.2)
BUN SERPL-MCNC: 18 MG/DL (ref 8–23)
BUN/CREAT SERPL: 24.3 (ref 7–25)
CALCIUM SPEC-SCNC: 9.9 MG/DL (ref 8.6–10.5)
CHLORIDE SERPL-SCNC: 102 MMOL/L (ref 98–107)
CHOLEST SERPL-MCNC: 223 MG/DL (ref 0–200)
CO2 SERPL-SCNC: 24.2 MMOL/L (ref 22–29)
CREAT SERPL-MCNC: 0.74 MG/DL (ref 0.57–1)
DEPRECATED RDW RBC AUTO: 41.3 FL (ref 37–54)
EGFRCR SERPLBLD CKD-EPI 2021: 91.6 ML/MIN/1.73
ERYTHROCYTE [DISTWIDTH] IN BLOOD BY AUTOMATED COUNT: 13.5 % (ref 12.3–15.4)
GLOBULIN UR ELPH-MCNC: 2.9 GM/DL
GLUCOSE SERPL-MCNC: 112 MG/DL (ref 65–99)
HCT VFR BLD AUTO: 39.7 % (ref 34–46.6)
HDLC SERPL-MCNC: 54 MG/DL (ref 40–60)
HGB BLD-MCNC: 13.2 G/DL (ref 12–15.9)
LDLC SERPL CALC-MCNC: 136 MG/DL (ref 0–100)
LDLC/HDLC SERPL: 2.45 {RATIO}
MCH RBC QN AUTO: 28.6 PG (ref 26.6–33)
MCHC RBC AUTO-ENTMCNC: 33.2 G/DL (ref 31.5–35.7)
MCV RBC AUTO: 86.1 FL (ref 79–97)
PLATELET # BLD AUTO: 225 10*3/MM3 (ref 140–450)
PMV BLD AUTO: 11 FL (ref 6–12)
POTASSIUM SERPL-SCNC: 4 MMOL/L (ref 3.5–5.2)
PROT SERPL-MCNC: 7.2 G/DL (ref 6–8.5)
RBC # BLD AUTO: 4.61 10*6/MM3 (ref 3.77–5.28)
SODIUM SERPL-SCNC: 142 MMOL/L (ref 136–145)
TRIGL SERPL-MCNC: 183 MG/DL (ref 0–150)
TSH SERPL DL<=0.05 MIU/L-ACNC: 1.42 UIU/ML (ref 0.27–4.2)
VLDLC SERPL-MCNC: 33 MG/DL (ref 5–40)
WBC NRBC COR # BLD AUTO: 4.66 10*3/MM3 (ref 3.4–10.8)

## 2024-06-10 PROCEDURE — 99396 PREV VISIT EST AGE 40-64: CPT | Performed by: NURSE PRACTITIONER

## 2024-06-10 PROCEDURE — 82306 VITAMIN D 25 HYDROXY: CPT | Performed by: NURSE PRACTITIONER

## 2024-06-10 PROCEDURE — 36415 COLL VENOUS BLD VENIPUNCTURE: CPT | Performed by: NURSE PRACTITIONER

## 2024-06-10 PROCEDURE — 80061 LIPID PANEL: CPT | Performed by: NURSE PRACTITIONER

## 2024-06-10 PROCEDURE — 80050 GENERAL HEALTH PANEL: CPT | Performed by: NURSE PRACTITIONER

## 2024-06-10 RX ORDER — BIMATOPROST 0.3 MG/ML
SOLUTION/ DROPS OPHTHALMIC
COMMUNITY
Start: 2024-04-30

## 2024-06-10 RX ORDER — HYDROCHLOROTHIAZIDE 12.5 MG/1
12.5 TABLET ORAL DAILY
Qty: 90 TABLET | Refills: 1 | Status: SHIPPED | OUTPATIENT
Start: 2024-06-10

## 2024-06-10 RX ORDER — LEVOTHYROXINE SODIUM 0.07 MG/1
75 TABLET ORAL DAILY
Qty: 90 TABLET | Refills: 1 | Status: SHIPPED | OUTPATIENT
Start: 2024-06-10

## 2024-06-10 RX ORDER — PAROXETINE 10 MG/1
10 TABLET, FILM COATED ORAL DAILY
Qty: 90 TABLET | Refills: 3 | Status: SHIPPED | OUTPATIENT
Start: 2024-06-10

## 2024-06-10 RX ORDER — AMLODIPINE BESYLATE 5 MG/1
5 TABLET ORAL DAILY
Qty: 90 TABLET | Refills: 1 | Status: SHIPPED | OUTPATIENT
Start: 2024-06-10

## 2024-06-10 RX ORDER — PRAVASTATIN SODIUM 20 MG
20 TABLET ORAL EVERY EVENING
Qty: 90 TABLET | Refills: 1 | Status: SHIPPED | OUTPATIENT
Start: 2024-06-10

## 2024-06-10 NOTE — PATIENT INSTRUCTIONS
Preventive Care 40-64 Years Old, Female  Preventive care refers to lifestyle choices and visits with your health care provider that can promote health and wellness. Preventive care visits are also called wellness exams.  What can I expect for my preventive care visit?  Counseling  Your health care provider may ask you questions about your:  Medical history, including:  Past medical problems.  Family medical history.  Pregnancy history.  Current health, including:  Menstrual cycle.  Method of birth control.  Emotional well-being.  Home life and relationship well-being.  Sexual activity and sexual health.  Lifestyle, including:  Alcohol, nicotine or tobacco, and drug use.  Access to firearms.  Diet, exercise, and sleep habits.  Work and work environment.  Sunscreen use.  Safety issues such as seatbelt and bike helmet use.  Physical exam  Your health care provider will check your:  Height and weight. These may be used to calculate your BMI (body mass index). BMI is a measurement that tells if you are at a healthy weight.  Waist circumference. This measures the distance around your waistline. This measurement also tells if you are at a healthy weight and may help predict your risk of certain diseases, such as type 2 diabetes and high blood pressure.  Heart rate and blood pressure.  Body temperature.  Skin for abnormal spots.  What immunizations do I need?    Vaccines are usually given at various ages, according to a schedule. Your health care provider will recommend vaccines for you based on your age, medical history, and lifestyle or other factors, such as travel or where you work.  What tests do I need?  Screening  Your health care provider may recommend screening tests for certain conditions. This may include:  Lipid and cholesterol levels.  Diabetes screening. This is done by checking your blood sugar (glucose) after you have not eaten for a while (fasting).  Pelvic exam and Pap test.  Hepatitis B test.  Hepatitis C  test.  HIV (human immunodeficiency virus) test.  STI (sexually transmitted infection) testing, if you are at risk.  Lung cancer screening.  Colorectal cancer screening.  Mammogram. Talk with your health care provider about when you should start having regular mammograms. This may depend on whether you have a family history of breast cancer.  BRCA-related cancer screening. This may be done if you have a family history of breast, ovarian, tubal, or peritoneal cancers.  Bone density scan. This is done to screen for osteoporosis.  Talk with your health care provider about your test results, treatment options, and if necessary, the need for more tests.  Follow these instructions at home:  Eating and drinking    Eat a diet that includes fresh fruits and vegetables, whole grains, lean protein, and low-fat dairy products.  Take vitamin and mineral supplements as recommended by your health care provider.  Do not drink alcohol if:  Your health care provider tells you not to drink.  You are pregnant, may be pregnant, or are planning to become pregnant.  If you drink alcohol:  Limit how much you have to 0-1 drink a day.  Know how much alcohol is in your drink. In the [place] one drink equals one 12 oz bottle of beer (355 mL), one 5 oz glass of wine (148 mL), or one 1½ oz glass of hard liquor (44 mL).  Lifestyle  Brush your teeth every morning and night with fluoride toothpaste. Floss one time each day.  Exercise for at least 30 minutes 5 or more days each week.  Do not use any products that contain nicotine or tobacco. These products include cigarettes, chewing tobacco, and vaping devices, such as e-cigarettes. If you need help quitting, ask your health care provider.  Do not use drugs.  If you are sexually active, practice safe sex. Use a condom or other form of protection to prevent STIs.  If you do not wish to become pregnant, use a form of birth control. If you plan to become pregnant, see your health care provider for a  prepregnancy visit.  Take aspirin only as told by your health care provider. Make sure that you understand how much to take and what form to take. Work with your health care provider to find out whether it is safe and beneficial for you to take aspirin daily.  Find healthy ways to manage stress, such as:  Meditation, yoga, or listening to music.  Journaling.  Talking to a trusted person.  Spending time with friends and family.  Minimize exposure to UV radiation to reduce your risk of skin cancer.  Safety  Always wear your seat belt while driving or riding in a vehicle.  Do not drive:  If you have been drinking alcohol. Do not ride with someone who has been drinking.  When you are tired or distracted.  While texting.  If you have been using any mind-altering substances or drugs.  Wear a helmet and other protective equipment during sports activities.  If you have firearms in your house, make sure you follow all gun safety procedures.  Seek help if you have been physically or sexually abused.  What's next?  Visit your health care provider once a year for an annual wellness visit.  Ask your health care provider how often you should have your eyes and teeth checked.  Stay up to date on all vaccines.  This information is not intended to replace advice given to you by your health care provider. Make sure you discuss any questions you have with your health care provider.  Document Revised: 06/15/2022 Document Reviewed: 06/15/2022  Elsevier Patient Education © 2024 [employer] Inc[employer]

## 2024-06-17 ENCOUNTER — OFFICE VISIT (OUTPATIENT)
Dept: SLEEP MEDICINE | Facility: HOSPITAL | Age: 64
End: 2024-06-17
Payer: COMMERCIAL

## 2024-06-17 VITALS
HEART RATE: 88 BPM | OXYGEN SATURATION: 96 % | HEIGHT: 64 IN | WEIGHT: 219 LBS | SYSTOLIC BLOOD PRESSURE: 123 MMHG | BODY MASS INDEX: 37.39 KG/M2 | DIASTOLIC BLOOD PRESSURE: 67 MMHG

## 2024-06-17 DIAGNOSIS — G47.34 SLEEP RELATED HYPOXIA: ICD-10-CM

## 2024-06-17 DIAGNOSIS — I10 ESSENTIAL HYPERTENSION: ICD-10-CM

## 2024-06-17 DIAGNOSIS — Z78.9 INTOLERANCE OF CONTINUOUS POSITIVE AIRWAY PRESSURE (CPAP) VENTILATION: ICD-10-CM

## 2024-06-17 DIAGNOSIS — R63.5 WEIGHT GAIN: ICD-10-CM

## 2024-06-17 DIAGNOSIS — G47.33 OBSTRUCTIVE SLEEP APNEA, ADULT: Primary | ICD-10-CM

## 2024-06-17 DIAGNOSIS — Z72.821 INADEQUATE SLEEP HYGIENE: ICD-10-CM

## 2024-06-17 DIAGNOSIS — E66.01 CLASS 2 SEVERE OBESITY WITH SERIOUS COMORBIDITY AND BODY MASS INDEX (BMI) OF 37.0 TO 37.9 IN ADULT, UNSPECIFIED OBESITY TYPE: ICD-10-CM

## 2024-06-17 PROCEDURE — G0463 HOSPITAL OUTPT CLINIC VISIT: HCPCS

## 2024-06-17 PROCEDURE — 99204 OFFICE O/P NEW MOD 45 MIN: CPT | Performed by: FAMILY MEDICINE

## 2024-06-17 NOTE — PROGRESS NOTES
[facility]  [address] [place], [place] [id number]  [place]   [place] [address]  Phone: [phone]  Fax: [phone]      Ana Plasencia  [id number]   1960  64 y.o.  female      Referring physician/provider and  PCP Nereyda Trivedi APRN    Type of service: Initial Sleep Medicine Consult.  Date of service: 6/17/2024      Chief Complaint   Patient presents with    Sleep Apnea    Daytime Sleepiness       Phoenetic pronuncitation of last name: Mendy abreu   History of present illness;  The patient was seen today on 6/17/2024 at [facility].    Thank you for asking to see Ana Plasencia, 64 y.o. PMHx HTN, anxiety, depression, hypothyroidism, migraines, obesity.  Patient  denies prior surgery namely tonsillectomy, nasal surgery or UPPP.       She was diagnosed 11 years ago with sleep apnea  Currently using a [employer]star auto CPAP which is functioning without issue  Air pressures may feel too much/too little  PAP therapy was attempted to be adjusted by her prior sleep physician  Using nasal mask without any leak symptoms that were reviewed with patient  She has never had a titration study  She has gained 20 pounds since her last sleep study  Despite nightly use of her CPAP she persists with excessive daytime sleepiness consistent with her preencounter [place] of 12/24  Denies near miss/MVC due to sleepiness.  She has significant hypoxia versus hypoventilation on out of center sleep testing 11 years      Save what is noted above in HPI, denies any OTHER past known:  cardiopulmonary conditions/neurologic disorders/neuromuscular disorders  Never needed supplemental O2 at home  Denies any opioid therapy  Denies any metal in head/neck/chest      Further Sleep History:    Bedtime: 11 PM-midnight  Rise Time: Around 9 AM  Sleep Latency: 30 minutes - 1 hour  Screens in bed: Yes  Wake after sleep onset: 2-3 times  Reasons for awakenings: Nocturia  Number of naps per day up to 1/day  Naps restorative:  "Denies  Caffeine use: 1 or more beverages per day    RLS Symptoms: No   Bruxism:No   Current sleep related gastroesophageal reflux symptoms:  No   Cataplexy:  No   Sleep Paralysis:  No   Hypnagogic or hypnopompic hallucinations: No   Parasomnias such as sleep walking or sleep eating No     -Patient unable to supply me with any compliance from the current device    Disclaimer Sleep History: The above sleep history is based on this sleep physician's in room encounter with the patient. Pre encounter self administered questionnaires are taken into consideration and discussed with patient for any discordance. The above documentation by this sleep physician is the most accurate clinical information determined by in room sleep physician encounter with patient.     MEDICAL CONDITIONS (PMH)   HTN  Anxiety  Depression  Hypothyroidism  Migraines  Obesity    Social history:  Do you drive a commercial vehicle:  No   Shift work:  No   Tobacco use:  No   Alcohol use: 0 per week  Occupation: Retired    Family Hx (parents and siblings) (pertaining to sleep medicine)  Sleep apnea-her sister  Obesity-mother and sister    Medications: reviewed    Review of systems is negative unless otherwise noted per HPI   Disclaimer History: The above history is based on this sleep physician's in room encounter with the patient. Pre encounter self administered questionnaires are taken into consideration and discussed with patient for any discordance. The above documentation by this sleep physician is the most accurate clinical information determined by in room sleep physician encounter with patient.     Physical exam:  Vitals:    06/17/24 1300   BP: 123/67   BP Location: Left arm   Patient Position: Sitting   Pulse: 88   SpO2: 96%   Weight: 99.3 kg (219 lb)   Height: 162.6 cm (64.02\")    Body mass index is 37.57 kg/m².   CONSTITUTIONAL:  Non-toxic, In no overt distress   Head: normocephalic   ENT: Mallampati class IV, macroglossia, no septal defects "   NECK:Neck Circumference: 13 inches,no nuchal rigidity  RESPIRATORY SYSTEM: Breath sounds are clear (no rales, no rhonchi, no wheezes), no accessory muscle use  CARDIOVASULAR SYSTEM: Heart sounds are regular rhythm and normal rate, no rub, no gallop, no edema  NEUROLOGICAL SYSTEM: Oriented x 3, No gross focal deficits   PSYCHIATRIC SYSTEM: Goal oriented, affect full range appropriate      Office notes from care team reviewed:    -6/10/2024 office visit PCP JAXSON Trivedi concern for sleep apnea    Labs reviewed.  TSH          6/10/2024    12:34   TSH   TSH 1.420       Most Recent A1C          6/6/2024    12:04   HGBA1C Most Recent   Hemoglobin A1C 6.20     - 6/10/2024  Bicarb 24.2    Diagnostics reviewed:      3/26/2013  [facility]  Appears to be an out of center sleep test very poor copy report with no attached interpretation:  Overall ULI 9-21/hr  O2 melissa 84.9%  No further oximetry breakdown  Independent review and conclusions drawn from above report:  At least mild severity sleep apnea  Sleep-related hypoxia versus hypoventilation cannot be ruled out      Assessment and plan:  Obstructive sleep apnea, adult [G47.33]  Sleep related hypoxia [G47.34]  Excessive daytime sleepiness   Weight gain  Intolerance of continuous positive airway pressure (CPAP) ventilation [Z78.9]  -Positive Airway Pressure Titration ordered to guide management of sleep disordered breathing  Patient's symptoms and examination is consistent with sleep apnea. have talked to the patient about the signs and symptoms of sleep apnea. In addition, I have also discussed pathophysiology of sleep apnea.  I also discussed the complications of untreated sleep apnea including effects on hypertension, diabetes mellitus and nonrestorative sleep with hypersomnia which can increase risk for motor vehicle accidents.  Untreated sleep apnea is also a risk factor for development of atrial fibrillation, hypertension, insulin resistance and  cerebrovascular accident. Counseled no driving or operating heavy machinery while sleepy. Patient was given opportunity to ask questions and all the questions were answered.   -Constipation continue current PAP until I have guidance from titration study  Excessive daytime sleepiness .  Patient endorses subjective excessive daytime sleepiness with sleep physician encounter which was consistent with patient's pre-encounter self-administered [place] Sleepiness Scale of Total score: 12.  There are many causes for daytime excessive sleepiness including depression, shiftwork syndrome, and other medical disorders including heart, kidney and liver failure.  From sleep disorders perspective this is sleep disordered breathing until proven otherwise. The most common cause of excessive sleepiness is due to sleep apnea with frequent awakenings during sleep time.  I have discussed safety of driving and to remain vigilant while driving; patient verbalized understanding of counseling.  Obesity, patient's BMI is Body mass index is 37.57 kg/m².. I have discussed the relationship between weight and sleep apnea.There is direct correlation between weight and severity of sleep apnea.  Weight reduction is encouraged, as it is going to reduce the severity of sleep apnea. I have also discussed with the patient diet and exercise to achieve ideal body weight.  Inadequate sleep hygiene [Z72.821]  Counseled the patient lifestyle modifications as below to be applied especially night of any sleep study, the patient verbalized understanding of same.   Essential hypertension [I10], Compliant wit home therapies reviewed.  Counseled patient PAP therapy compliance for treatment of obstructive sleep apnea may be beneficial for this comorbid condition.  Follow-up with PCP as previous for hypertension        I have also discussed with the patient the following  Sleep hygiene: Maintaining a regular bedtime and wake time, not to watch television or work in  bed, limit caffeine-containing beverages before bed time and avoid naps during the day  Adequate amount of sleep.  Generally most people needs about 7 to 8 hours of sleep.      Return for 31 to 90 days after PAP setup with down load.  Patient's questions were answered      I once again thank you for asking me to see this patient in consultation and I have forwarded my opinion and treatment plan.  Please do not hesitate to call me if you have any questions.       EMR Dragon/Transcription disclaimer:   Much of this encounter note is an electronic transcription/translation of spoken language to printed text. The electronic translation of spoken language may permit erroneous, or at times, nonsensical words or phrases to be inadvertently transcribed; Although I have reviewed the note for such errors, some may still exist.     NPI #: [id number]    Jeri Palacios, DO  Sleep Medicine  [facility]  06/17/24

## 2024-07-25 ENCOUNTER — HOSPITAL ENCOUNTER (OUTPATIENT)
Dept: SLEEP MEDICINE | Facility: HOSPITAL | Age: 64
End: 2024-07-25
Payer: COMMERCIAL

## 2024-07-25 DIAGNOSIS — G47.33 OBSTRUCTIVE SLEEP APNEA, ADULT: ICD-10-CM

## 2024-07-25 DIAGNOSIS — Z78.9 INTOLERANCE OF CONTINUOUS POSITIVE AIRWAY PRESSURE (CPAP) VENTILATION: ICD-10-CM

## 2024-07-25 DIAGNOSIS — G47.34 SLEEP RELATED HYPOXIA: ICD-10-CM

## 2024-07-25 PROCEDURE — 95811 POLYSOM 6/>YRS CPAP 4/> PARM: CPT

## 2024-07-29 DIAGNOSIS — I10 ESSENTIAL HYPERTENSION: ICD-10-CM

## 2024-07-29 DIAGNOSIS — G47.33 OBSTRUCTIVE SLEEP APNEA, ADULT: Primary | ICD-10-CM

## 2024-07-29 PROCEDURE — 95811 POLYSOM 6/>YRS CPAP 4/> PARM: CPT | Performed by: FAMILY MEDICINE

## 2024-07-31 ENCOUNTER — PATIENT ROUNDING (BHMG ONLY) (OUTPATIENT)
Dept: OBSTETRICS AND GYNECOLOGY | Facility: CLINIC | Age: 64
End: 2024-07-31
Payer: COMMERCIAL

## 2024-07-31 ENCOUNTER — OFFICE VISIT (OUTPATIENT)
Dept: OBSTETRICS AND GYNECOLOGY | Facility: CLINIC | Age: 64
End: 2024-07-31
Payer: COMMERCIAL

## 2024-07-31 VITALS
HEART RATE: 83 BPM | DIASTOLIC BLOOD PRESSURE: 75 MMHG | SYSTOLIC BLOOD PRESSURE: 136 MMHG | BODY MASS INDEX: 37.4 KG/M2 | WEIGHT: 218 LBS

## 2024-07-31 DIAGNOSIS — Z01.419 ENCOUNTER FOR GYNECOLOGICAL EXAMINATION WITHOUT ABNORMAL FINDING: Primary | ICD-10-CM

## 2024-07-31 PROCEDURE — 87624 HPV HI-RISK TYP POOLED RSLT: CPT | Performed by: NURSE PRACTITIONER

## 2024-07-31 PROCEDURE — 99459 PELVIC EXAMINATION: CPT | Performed by: NURSE PRACTITIONER

## 2024-07-31 PROCEDURE — G0123 SCREEN CERV/VAG THIN LAYER: HCPCS | Performed by: NURSE PRACTITIONER

## 2024-07-31 PROCEDURE — 99396 PREV VISIT EST AGE 40-64: CPT | Performed by: NURSE PRACTITIONER

## 2024-07-31 NOTE — PROGRESS NOTES
Well Woman Visit    CC: Scheduled annual well gyn visit  Chief Complaint   Patient presents with    Gynecologic Exam     wwe       [facility] intake in the past?: no  DID NOT QUALIFY TODAY    HPI:   64 y.o.No obstetric history on file.   Social History     Substance and Sexual Activity   Sexual Activity Not Currently    Partners: Male    Birth control/protection: Vasectomy       Menses:   denies any vaginal bleeding.     PCP: does manage PMHx and preventative labs  History: PMHx, Meds, Allergies, PSHx, Social Hx, and POBHx all reviewed and updated.    Pt has no complaints today.    PHYSICAL EXAM:  /75   Pulse 83   Wt 98.9 kg (218 lb)   BMI 37.40 kg/m²  Not found.     Exam conducted with a chaperone present  General- NAD, alert and oriented, appropriate  Psych- Normal mood, good memory  Neck- No masses, no thyroid enlargement  CV- Regular rhythm, no murnurs  Resp- CTA to bases, no wheezes  Abdomen- Soft, non distended, non tender, no masses    Breast left-  Bilaterally symmetrical, no masses, non tender, no nipple discharge  Breast right- Bilaterally symmetrical, no masses, non tender, no nipple discharge    External genitalia- Normal female, no lesions  Urethra/meatus- Normal, no masses, non tender  Bladder- Normal, no masses, non tender  Vagina- Normal, no atrophy, no lesions, no discharge.  Prolapse : none noted, not examined with split speculum to delineate  Cvx- Normal, no lesions, no discharge, No cervical motion tenderness  Uterus- Normal size, shape & consistency.  Non tender, mobile.  Adnexa- No mass, non tender  Anus/Rectum/Perineum- Not performed    Lymphatic- No palpable neck, axillary, or groin nodes  Ext- No edema, no cyanosis    Skin- No lesions, no rashes, no acanthosis nigricans      ASSESSMENT and PLAN:    Diagnoses and all orders for this visit:    1. Encounter for gynecological examination without abnormal finding (Primary)  -     IgP, Aptima HPV  -     Mammo Screening Digital Tomosynthesis  Bilateral With CAD; Future        Preventative:  BREAST HEALTH- Monthly self breast exam importance and how to reviewed. MMG and/or MRI (prn) reviewed per society guidelines and her individual history. Screen: Updated today  CERVICAL CANCER Screening- Reviewed current ASCCP guidelines for screening w and wo cotest HPV, age specific.  Screen: Updated today  COLON CANCER Screening- Reviewed current medical society guidelines and options.  Screen:  Already up to date  SEXUAL HEALTH: Declines STD screening  VACCINATIONS Recommended: Covid vaccine, Flu annually.  Importance discussed, risk being unvaccinated reviewed.  Questions answered  Smoking status- NON SMOKER/VAPER        She understands the importance of having any ordered tests to be performed in a timely fashion.  The risks of not performing them include, but are not limited to, advanced cancer stages, bone loss from osteoporosis and/or subsequent increase in morbidity and/or mortality.  She is encouraged to review her results online and/or contact or office if she has questions.     Follow Up:  Return in about 1 year (around 7/31/2025) for Annual physical.        Ganga Pat, APRN  07/31/2024    [facility] [address]  [facility]  [address] [place] [address]  Dept: [phone]  Dept Fax: [phone]  Loc: [phone]

## 2024-08-01 ENCOUNTER — TELEPHONE (OUTPATIENT)
Dept: SLEEP MEDICINE | Facility: HOSPITAL | Age: 64
End: 2024-08-01
Payer: COMMERCIAL

## 2024-08-05 LAB
CYTOLOGIST CVX/VAG CYTO: NORMAL
CYTOLOGY CVX/VAG DOC CYTO: NORMAL
CYTOLOGY CVX/VAG DOC THIN PREP: NORMAL
DX ICD CODE: NORMAL
HPV I/H RISK 4 DNA CVX QL PROBE+SIG AMP: NEGATIVE
Lab: NORMAL
OTHER STN SPEC: NORMAL
STAT OF ADQ CVX/VAG CYTO-IMP: NORMAL

## 2024-08-06 ENCOUNTER — TELEPHONE (OUTPATIENT)
Dept: SLEEP MEDICINE | Facility: HOSPITAL | Age: 64
End: 2024-08-06
Payer: COMMERCIAL

## 2024-10-03 ENCOUNTER — HOSPITAL ENCOUNTER (OUTPATIENT)
Dept: MAMMOGRAPHY | Facility: HOSPITAL | Age: 64
Discharge: HOME | End: 2024-10-03
Admitting: NURSE PRACTITIONER
Payer: COMMERCIAL

## 2024-10-03 DIAGNOSIS — Z01.419 ENCOUNTER FOR GYNECOLOGICAL EXAMINATION WITHOUT ABNORMAL FINDING: ICD-10-CM

## 2024-10-03 PROCEDURE — 77063 BREAST TOMOSYNTHESIS BI: CPT

## 2024-10-03 PROCEDURE — 77067 SCR MAMMO BI INCL CAD: CPT

## 2024-10-07 ENCOUNTER — OFFICE VISIT (OUTPATIENT)
Dept: SLEEP MEDICINE | Facility: HOSPITAL | Age: 64
End: 2024-10-07
Payer: COMMERCIAL

## 2024-10-07 VITALS
OXYGEN SATURATION: 96 % | SYSTOLIC BLOOD PRESSURE: 129 MMHG | WEIGHT: 213 LBS | HEIGHT: 64 IN | HEART RATE: 71 BPM | DIASTOLIC BLOOD PRESSURE: 63 MMHG | BODY MASS INDEX: 36.37 KG/M2

## 2024-10-07 DIAGNOSIS — G47.33 OBSTRUCTIVE SLEEP APNEA, ADULT: Primary | ICD-10-CM

## 2024-10-07 DIAGNOSIS — E66.812 CLASS 2 SEVERE OBESITY WITH SERIOUS COMORBIDITY AND BODY MASS INDEX (BMI) OF 36.0 TO 36.9 IN ADULT, UNSPECIFIED OBESITY TYPE: ICD-10-CM

## 2024-10-07 DIAGNOSIS — I10 ESSENTIAL HYPERTENSION: ICD-10-CM

## 2024-10-07 DIAGNOSIS — E66.01 CLASS 2 SEVERE OBESITY WITH SERIOUS COMORBIDITY AND BODY MASS INDEX (BMI) OF 36.0 TO 36.9 IN ADULT, UNSPECIFIED OBESITY TYPE: ICD-10-CM

## 2024-10-07 PROCEDURE — G0463 HOSPITAL OUTPT CLINIC VISIT: HCPCS

## 2024-10-07 PROCEDURE — 99214 OFFICE O/P EST MOD 30 MIN: CPT | Performed by: FAMILY MEDICINE

## 2024-10-07 NOTE — PROGRESS NOTES
[address] [place] [id number]  Phone: [phone]  Fax: [phone]      SLEEP CLINIC FOLLOW UP PROGRESS NOTE.    Ana Plasencia  [id number]   1960  64 y.o.  female      PCP: Nereyda Trivedi APRN      Date of visit: 10/7/2024    Chief Complaint   Patient presents with    Sleep Apnea       Medications and allergies are reviewed by me and documented in the encounter.     SOCIAL (habits pertaining to sleep medicine)  History tobacco use:No  History of alcohol use: 0 per week  Caffeine use: 2 beverages/d    HPI:  This is a 64 y.o. PMHx HTN.   Here for management of obstructive sleep apnea (overall ULI 9-21/h with sleep-related hypoxia on HST 3/26/2013 done at [facility]; s/p titration study on s/p titration study on 7/25/2024 with oxygenation adequate on PAP started on new auto-CPAP). Patient is using positive airway pressure therapy and the symptoms of sleep apnea have improved significantly on the therapy. Normally patient goes to bed at 11 PM to midnight and wakes up at 9-10 AM .  The patient wakes up 2-3 time(s) during the night and has no problem going back to sleep.  Feels refreshed after waking up.     Overall patient's Impression of their PAP therapy is: No air pressure issues  Doing great with new autoCPAP quiter and smaller than her old REM star prior device  Alternates between nasal N30 and F&P Solo - sounds like N3O working better for her than solo which is also a good mask     Compliance data as reviewed by me with patient room today:  Date range 9/2/2024 - 10/1/2024  Overall use 87%  4-hour trang 73%  Average days used 6 hours 35 minutes  Device AirSense 10 AutoSet  Settings 8-20 cm H2O EPR 2 full-time  95th percentile pressure 10.1 cm H2O  95th percentile leak is 14.2 LPM - appropriate intentional air leak  AHI is 1.0 - at goal  DME: [facility]  Mask used: nasal N30 and F&P Solo    -BP in sleep clinic today 129/63  States she feels well today  Compliant with home  "therapies reviewed    - Obesity Body mass index is 36.54 kg/m².    Wt Readings from Last 3 Encounters:   10/07/24 96.6 kg (213 lb)   07/31/24 98.9 kg (218 lb)   06/17/24 99.3 kg (219 lb)       -Last seen in sleep clinic~4 months ago on 6/17/2024 was diagnosed with sleep apnea 11 years ago had a REMstar auto-CPAP.  Gained 20 pounds since last sleep study.  Titration study was ordered.      REVIEW OF SYSTEMS:   Is negative unless otherwise noted in HPI  Shidler Sleepiness Scale :Total score: 8     Disclaimer History: The above history is based on this sleep physician's in room encounter with the patient. Pre encounter self administered questionnaires are taken into consideration and discussed with patient for any discordance. The above documentation by this sleep physician is the most accurate clinical information determined by in room sleep physician encounter with patient.     PHYSICAL EXAMINATION:  Vitals:    10/07/24 1300   BP: 129/63   BP Location: Left arm   Patient Position: Sitting   Pulse: 71   SpO2: 96%   Weight: 96.6 kg (213 lb)   Height: 162.6 cm (64.02\")    Body mass index is 36.54 kg/m².   CONSTITUTIONAL: Well appearing, in no overt pain or respiratory distress   NOSE: No septal defect  RESP SYSTEM:  No overt respiratory distress, speaks in clear sentences without dyspnea, no accessory muscle use  CARDIOVASULAR: No edema noted  NEURO: Oriented x 3, no gross focal deficits     Compliance data as reviewed by me with patient room today:  Date range 9/2/2024 - 10/1/2024  Overall use 87%  4-hour trang 73%  Average days used 6 hours 35 minutes  Device AirSense 10 AutoSet  Settings 8-20 cm H2O EPR 2 full-time  95th percentile pressure 10.1 cm H2O  95th percentile leak is 14.2 LPM - appropriate intentional air leak  AHI is 1.0 - at goal  DME: [facility]  Mask used: nasal N30 and F&P Solo    ASSESSMENT AND PLAN:  Obstructive sleep apnea ( G 47.33).    -Specific Changes made by me today:  I. After review of " compliance data, in visit clinical correlation, and through shared decision making: will not make any changes to PAP therapy settings.  II.  Counseled patient to follow-up with me in 1 year for therapy review.  Counseled may request sooner follow-up to sleep clinic anytime the patient feels necessary.  The symptoms of sleep apnea have improved with the device and the treatment.  Patient's compliance with the device is excellent for treatment of sleep apnea.  I have independently reviewed the smart card down load and discussed with the patient the download data and encouarged the patient to continue to use the device.The residual AHI is acceptable. The device is benefiting the patient and the device is medically necessary.  Without proper control of sleep apnea and good compliance there is a increased risk for hypertension, diabetes mellitus and nonrestorative sleep with hypersomnia which can increase risk for motor vehicle accidents.  Untreated sleep apnea is also a risk factor for development of atrial fibrillation, pulmonary hypertension, insulin resistance and stroke. The patient is also instructed to get the supplies from the [facility] and and change them on a regular basis.  A prescription for supplies has been sent to the [facility].  I have also discussed the good sleep hygiene habits and adequate amount of sleep needed for good health.  Obesity   with BMI is Body mass index is 36.54 kg/m².. Counseled weight loss will be beneficial for reduction in severity of sleep apnea, healthy diet/exercise to achieve same, follow up with primary care physician for serial monitoring and to further guide management.  Essential hypertension [I10], Compliant wit home therapies reviewed.  Counseled patient PAP therapy compliance for treatment of obstructive sleep apnea may be beneficial for this comorbid condition.  Follow-up with PCP as previous for hypertension       Follow up in 1 year . Patient's questions were  answered.        JAILYN Dragon/Transcription disclaimer:   Much of this encounter note is an electronic transcription/translation of spoken language to printed text. The electronic translation of spoken language may permit erroneous, or at times, nonsensical words or phrases to be inadvertently transcribed; Although I have reviewed the note for such errors, some may still exist.       NPI #: [id number]    Jeri Palacios,   Sleep Medicine  [facility]  10/07/24

## 2024-12-26 DIAGNOSIS — E03.9 HYPOTHYROIDISM, UNSPECIFIED TYPE: ICD-10-CM

## 2024-12-26 DIAGNOSIS — E78.5 HYPERLIPIDEMIA, UNSPECIFIED HYPERLIPIDEMIA TYPE: ICD-10-CM

## 2024-12-26 RX ORDER — PRAVASTATIN SODIUM 20 MG
20 TABLET ORAL EVERY EVENING
Qty: 90 TABLET | Refills: 0 | Status: SHIPPED | OUTPATIENT
Start: 2024-12-26

## 2024-12-26 RX ORDER — LEVOTHYROXINE SODIUM 75 UG/1
TABLET ORAL
Qty: 90 TABLET | Refills: 0 | Status: SHIPPED | OUTPATIENT
Start: 2024-12-26

## 2025-01-20 ENCOUNTER — OFFICE VISIT (OUTPATIENT)
Dept: FAMILY MEDICINE CLINIC | Facility: CLINIC | Age: 65
End: 2025-01-20
Payer: COMMERCIAL

## 2025-01-20 VITALS
TEMPERATURE: 98 F | OXYGEN SATURATION: 96 % | DIASTOLIC BLOOD PRESSURE: 79 MMHG | WEIGHT: 209.8 LBS | HEIGHT: 64 IN | HEART RATE: 74 BPM | BODY MASS INDEX: 35.82 KG/M2 | SYSTOLIC BLOOD PRESSURE: 126 MMHG

## 2025-01-20 DIAGNOSIS — Z13.29 SCREENING FOR THYROID DISORDER: ICD-10-CM

## 2025-01-20 DIAGNOSIS — Z00.00 LABORATORY EXAM ORDERED AS PART OF ROUTINE GENERAL MEDICAL EXAMINATION: ICD-10-CM

## 2025-01-20 DIAGNOSIS — R73.01 IMPAIRED FASTING GLUCOSE: Primary | ICD-10-CM

## 2025-01-20 DIAGNOSIS — Z13.6 ENCOUNTER FOR LIPID SCREENING FOR CARDIOVASCULAR DISEASE: ICD-10-CM

## 2025-01-20 DIAGNOSIS — Z13.220 SCREENING FOR LIPID DISORDERS: ICD-10-CM

## 2025-01-20 DIAGNOSIS — E66.01 CLASS 2 SEVERE OBESITY DUE TO EXCESS CALORIES WITH SERIOUS COMORBIDITY AND BODY MASS INDEX (BMI) OF 35.0 TO 35.9 IN ADULT: ICD-10-CM

## 2025-01-20 DIAGNOSIS — Z13.220 ENCOUNTER FOR LIPID SCREENING FOR CARDIOVASCULAR DISEASE: ICD-10-CM

## 2025-01-20 DIAGNOSIS — H61.23 BILATERAL IMPACTED CERUMEN: ICD-10-CM

## 2025-01-20 DIAGNOSIS — E66.812 CLASS 2 SEVERE OBESITY DUE TO EXCESS CALORIES WITH SERIOUS COMORBIDITY AND BODY MASS INDEX (BMI) OF 35.0 TO 35.9 IN ADULT: ICD-10-CM

## 2025-01-20 DIAGNOSIS — H57.9 EYE EXAM ABNORMAL: ICD-10-CM

## 2025-01-20 LAB
ALBUMIN SERPL-MCNC: 4.5 G/DL (ref 3.5–5.2)
ALBUMIN/GLOB SERPL: 1.6 G/DL
ALP SERPL-CCNC: 120 U/L (ref 39–117)
ALT SERPL W P-5'-P-CCNC: 25 U/L (ref 1–33)
ANION GAP SERPL CALCULATED.3IONS-SCNC: 12 MMOL/L (ref 5–15)
AST SERPL-CCNC: 21 U/L (ref 1–32)
BILIRUB SERPL-MCNC: 0.4 MG/DL (ref 0–1.2)
BUN SERPL-MCNC: 16 MG/DL (ref 8–23)
BUN/CREAT SERPL: 17.8 (ref 7–25)
CALCIUM SPEC-SCNC: 10 MG/DL (ref 8.6–10.5)
CHLORIDE SERPL-SCNC: 100 MMOL/L (ref 98–107)
CHOLEST SERPL-MCNC: 179 MG/DL (ref 0–200)
CO2 SERPL-SCNC: 29 MMOL/L (ref 22–29)
CREAT SERPL-MCNC: 0.9 MG/DL (ref 0.57–1)
DEPRECATED RDW RBC AUTO: 40.2 FL (ref 37–54)
EGFRCR SERPLBLD CKD-EPI 2021: 72 ML/MIN/1.73
ERYTHROCYTE [DISTWIDTH] IN BLOOD BY AUTOMATED COUNT: 12.9 % (ref 12.3–15.4)
GLOBULIN UR ELPH-MCNC: 2.9 GM/DL
GLUCOSE SERPL-MCNC: 93 MG/DL (ref 65–99)
HBA1C MFR BLD: 5.6 % (ref 4.8–5.6)
HCT VFR BLD AUTO: 40.5 % (ref 34–46.6)
HDLC SERPL-MCNC: 57 MG/DL (ref 40–60)
HGB BLD-MCNC: 13.7 G/DL (ref 12–15.9)
LDLC SERPL CALC-MCNC: 98 MG/DL (ref 0–100)
LDLC/HDLC SERPL: 1.67 {RATIO}
MCH RBC QN AUTO: 29 PG (ref 26.6–33)
MCHC RBC AUTO-ENTMCNC: 33.8 G/DL (ref 31.5–35.7)
MCV RBC AUTO: 85.8 FL (ref 79–97)
PLATELET # BLD AUTO: 251 10*3/MM3 (ref 140–450)
PMV BLD AUTO: 10.9 FL (ref 6–12)
POTASSIUM SERPL-SCNC: 4 MMOL/L (ref 3.5–5.2)
PROT SERPL-MCNC: 7.4 G/DL (ref 6–8.5)
RBC # BLD AUTO: 4.72 10*6/MM3 (ref 3.77–5.28)
SODIUM SERPL-SCNC: 141 MMOL/L (ref 136–145)
TRIGL SERPL-MCNC: 135 MG/DL (ref 0–150)
TSH SERPL DL<=0.05 MIU/L-ACNC: 1.51 UIU/ML (ref 0.27–4.2)
VLDLC SERPL-MCNC: 24 MG/DL (ref 5–40)
WBC NRBC COR # BLD AUTO: 5.77 10*3/MM3 (ref 3.4–10.8)

## 2025-01-20 PROCEDURE — 83036 HEMOGLOBIN GLYCOSYLATED A1C: CPT | Performed by: NURSE PRACTITIONER

## 2025-01-20 PROCEDURE — 80061 LIPID PANEL: CPT | Performed by: NURSE PRACTITIONER

## 2025-01-20 PROCEDURE — 69209 REMOVE IMPACTED EAR WAX UNI: CPT | Performed by: NURSE PRACTITIONER

## 2025-01-20 PROCEDURE — 86038 ANTINUCLEAR ANTIBODIES: CPT | Performed by: NURSE PRACTITIONER

## 2025-01-20 PROCEDURE — 80050 GENERAL HEALTH PANEL: CPT | Performed by: NURSE PRACTITIONER

## 2025-01-20 PROCEDURE — 99214 OFFICE O/P EST MOD 30 MIN: CPT | Performed by: NURSE PRACTITIONER

## 2025-01-20 RX ORDER — PREDNISOLONE ACETATE 10 MG/ML
SUSPENSION/ DROPS OPHTHALMIC
COMMUNITY
Start: 2025-01-15

## 2025-01-20 NOTE — PATIENT INSTRUCTIONS
Carbohydrate Counting for Diabetes Mellitus, Adult  Carbohydrate counting is a method of keeping track of how many carbohydrates you eat. Eating carbohydrates increases the amount of sugar (glucose) in the blood. Counting how many carbohydrates you eat improves how well you manage your blood glucose. This, in turn, helps you manage your diabetes.  Carbohydrates are measured in grams (g) per serving. It is important to know how many carbohydrates (in grams or by serving size) you can have in each meal. This is different for every person. A dietitian can help you make a meal plan and calculate how many carbohydrates you should have at each meal and snack.  What foods contain carbohydrates?  Carbohydrates are found in the following foods:  Grains, such as breads and cereals.  Dried beans and soy products.  Starchy vegetables, such as potatoes, peas, and corn.  Fruit and fruit juices.  Milk and yogurt.  Sweets and snack foods, such as cake, cookies, candy, chips, and soft drinks.  How do I count carbohydrates in foods?  There are two ways to count carbohydrates in food. You can read food labels or learn standard serving sizes of foods. You can use either of these methods or a combination of both.  Using the Nutrition Facts label  The Nutrition Facts list is included on the labels of almost all packaged foods and beverages in the [place]. It includes:  The serving size.  Information about nutrients in each serving, including the grams of carbohydrate per serving.  To use the Nutrition Facts, decide how many servings you will have. Then, multiply the number of servings by the number of carbohydrates per serving. The resulting number is the total grams of carbohydrates that you will be having.  Learning the standard serving sizes of foods  When you eat carbohydrate foods that are not packaged or do not include Nutrition Facts on the label, you need to measure the servings in order to count the grams of  carbohydrates.  Measure the foods that you will eat with a food scale or measuring cup, if needed.  Decide how many standard-size servings you will eat.  Multiply the number of servings by 15. For foods that contain carbohydrates, one serving equals 15 g of carbohydrates.  For example, if you eat 2 cups or 10 oz (300 g) of strawberries, you will have eaten 2 servings and 30 g of carbohydrates (2 servings x 15 g = 30 g).  For foods that have more than one food mixed, such as soups and casseroles, you must count the carbohydrates in each food that is included.  The following list contains standard serving sizes of common carbohydrate-rich foods. Each of these servings has about 15 g of carbohydrates:  1 slice of bread.  1 six-inch (15 cm) tortilla.  ? cup or 2 oz (53 g) cooked rice or pasta.  ½ cup or 3 oz (85 g) cooked or canned, drained and rinsed beans or lentils.  ½ cup or 3 oz (85 g) starchy vegetable, such as peas, corn, or squash.  ½ cup or 4 oz (120 g) hot cereal.  ½ cup or 3 oz (85 g) boiled or mashed potatoes, or ¼ or 3 oz (85 g) of a large baked potato.  ½ cup or 4 fl oz (118 mL) fruit juice.  1 cup or 8 fl oz (237 mL) milk.  1 small or 4 oz (106 g) apple.  ½ or 2 oz (63 g) of a medium banana.  1 cup or 5 oz (150 g) strawberries.  3 cups or 1 oz (28.3 g) popped popcorn.  What is an example of carbohydrate counting?  To calculate the grams of carbohydrates in this sample meal, follow the steps shown below.  Sample meal  3 oz (85 g) chicken breast.  ? cup or 4 oz (106 g) brown rice.  ½ cup or 3 oz (85 g) corn.  1 cup or 8 fl oz (237 mL) milk.  1 cup or 5 oz (150 g) strawberries with sugar-free whipped topping.  Carbohydrate calculation  Identify the foods that contain carbohydrates:  Rice.  Corn.  Milk.  Strawberries.  Calculate how many servings you have of each food:  2 servings rice.  1 serving corn.  1 serving milk.  1 serving strawberries.  Multiply each number of servings by 15  servings rice x 15  g = 30 g.  1 serving corn x 15 g = 15 g.  1 serving milk x 15 g = 15 g.  1 serving strawberries x 15 g = 15 g.  Add together all of the amounts to find the total grams of carbohydrates eaten:  30 g + 15 g + 15 g + 15 g = 75 g of carbohydrates total.  What are tips for following this plan?  Shopping  Develop a meal plan and then make a shopping list.  Buy fresh and frozen vegetables, fresh and frozen fruit, dairy, eggs, beans, lentils, and whole grains.  Look at food labels. Choose foods that have more fiber and less sugar.  Avoid processed foods and foods with added sugars.  Meal planning  Aim to have the same number of grams of carbohydrates at each meal and for each snack time.  Plan to have regular, balanced meals and snacks.  Where to find more information  American Diabetes Association: diabetes.org  Centers for Disease Control and Prevention: cdc.gov  Academy of Nutrition and Dietetics: eatright.org  Association of Diabetes Care & Education Specialists: diabeteseducator.org  Summary  Carbohydrate counting is a method of keeping track of how many carbohydrates you eat.  Eating carbohydrates increases the amount of sugar (glucose) in your blood.  Counting how many carbohydrates you eat improves how well you manage your blood glucose. This helps you manage your diabetes.  A dietitian can help you make a meal plan and calculate how many carbohydrates you should have at each meal and snack.  This information is not intended to replace advice given to you by your health care provider. Make sure you discuss any questions you have with your health care provider.  Document Revised: 07/20/2021 Document Reviewed: 07/21/2021  Elsevier Patient Education © 2024 [employer]

## 2025-01-22 LAB
ANA SER QL IF: NEGATIVE
LABORATORY COMMENT REPORT: NORMAL

## 2025-01-23 ENCOUNTER — TELEPHONE (OUTPATIENT)
Dept: FAMILY MEDICINE CLINIC | Facility: CLINIC | Age: 65
End: 2025-01-23
Payer: COMMERCIAL

## 2025-02-10 DIAGNOSIS — I10 ESSENTIAL HYPERTENSION: ICD-10-CM

## 2025-02-10 RX ORDER — AMLODIPINE BESYLATE 5 MG/1
5 TABLET ORAL DAILY
Qty: 90 TABLET | Refills: 0 | Status: SHIPPED | OUTPATIENT
Start: 2025-02-10

## 2025-02-24 DIAGNOSIS — I10 ESSENTIAL HYPERTENSION: ICD-10-CM

## 2025-02-24 RX ORDER — HYDROCHLOROTHIAZIDE 12.5 MG/1
TABLET ORAL
Qty: 90 TABLET | Refills: 0 | Status: SHIPPED | OUTPATIENT
Start: 2025-02-24

## 2025-03-03 ENCOUNTER — PATIENT MESSAGE (OUTPATIENT)
Dept: FAMILY MEDICINE CLINIC | Facility: CLINIC | Age: 65
End: 2025-03-03
Payer: COMMERCIAL

## 2025-03-03 DIAGNOSIS — T75.3XXA SEA SICKNESS, INITIAL ENCOUNTER: Primary | ICD-10-CM

## 2025-03-03 RX ORDER — SCOPOLAMINE 1 MG/3D
1 PATCH, EXTENDED RELEASE TRANSDERMAL
Qty: 4 EACH | Refills: 0 | Status: SHIPPED | OUTPATIENT
Start: 2025-03-03

## 2025-03-26 DIAGNOSIS — E55.9 VITAMIN D DEFICIENCY: ICD-10-CM

## 2025-03-26 DIAGNOSIS — E03.9 HYPOTHYROIDISM, UNSPECIFIED TYPE: ICD-10-CM

## 2025-03-26 DIAGNOSIS — E78.5 HYPERLIPIDEMIA, UNSPECIFIED HYPERLIPIDEMIA TYPE: ICD-10-CM

## 2025-03-26 RX ORDER — LEVOTHYROXINE SODIUM 75 UG/1
TABLET ORAL
Qty: 90 TABLET | Refills: 0 | Status: SHIPPED | OUTPATIENT
Start: 2025-03-26

## 2025-03-26 RX ORDER — CHOLECALCIFEROL (VITAMIN D3) 50 MCG
1 TABLET ORAL DAILY
Qty: 90 TABLET | Refills: 2 | Status: SHIPPED | OUTPATIENT
Start: 2025-03-26

## 2025-03-26 RX ORDER — PRAVASTATIN SODIUM 20 MG
20 TABLET ORAL EVERY EVENING
Qty: 90 TABLET | Refills: 0 | Status: SHIPPED | OUTPATIENT
Start: 2025-03-26

## 2025-05-13 DIAGNOSIS — I10 ESSENTIAL HYPERTENSION: ICD-10-CM

## 2025-05-14 DIAGNOSIS — I10 ESSENTIAL HYPERTENSION: ICD-10-CM

## 2025-05-14 RX ORDER — AMLODIPINE BESYLATE 5 MG/1
5 TABLET ORAL DAILY
Qty: 90 TABLET | Refills: 1 | Status: SHIPPED | OUTPATIENT
Start: 2025-05-14

## 2025-05-14 RX ORDER — HYDROCHLOROTHIAZIDE 12.5 MG/1
TABLET ORAL
Qty: 90 TABLET | Refills: 0 | Status: SHIPPED | OUTPATIENT
Start: 2025-05-14

## 2025-06-28 DIAGNOSIS — E78.5 HYPERLIPIDEMIA, UNSPECIFIED HYPERLIPIDEMIA TYPE: ICD-10-CM

## 2025-06-30 RX ORDER — PRAVASTATIN SODIUM 20 MG
20 TABLET ORAL EVERY EVENING
Qty: 90 TABLET | Refills: 1 | Status: SHIPPED | OUTPATIENT
Start: 2025-06-30

## 2025-07-07 DIAGNOSIS — E03.9 HYPOTHYROIDISM, UNSPECIFIED TYPE: ICD-10-CM

## 2025-07-07 RX ORDER — LEVOTHYROXINE SODIUM 75 UG/1
TABLET ORAL
Qty: 90 TABLET | Refills: 0 | Status: SHIPPED | OUTPATIENT
Start: 2025-07-07

## 2025-07-31 ENCOUNTER — OFFICE VISIT (OUTPATIENT)
Dept: FAMILY MEDICINE CLINIC | Facility: CLINIC | Age: 65
End: 2025-07-31
Payer: COMMERCIAL

## 2025-07-31 VITALS
TEMPERATURE: 98.5 F | BODY MASS INDEX: 33.43 KG/M2 | DIASTOLIC BLOOD PRESSURE: 78 MMHG | HEIGHT: 64 IN | SYSTOLIC BLOOD PRESSURE: 116 MMHG | WEIGHT: 195.8 LBS | HEART RATE: 80 BPM | OXYGEN SATURATION: 96 %

## 2025-07-31 DIAGNOSIS — F33.41 RECURRENT MAJOR DEPRESSIVE DISORDER, IN PARTIAL REMISSION: ICD-10-CM

## 2025-07-31 DIAGNOSIS — E78.5 HYPERLIPIDEMIA, UNSPECIFIED HYPERLIPIDEMIA TYPE: ICD-10-CM

## 2025-07-31 DIAGNOSIS — I10 ESSENTIAL HYPERTENSION: ICD-10-CM

## 2025-07-31 DIAGNOSIS — Z23 NEED FOR PNEUMOCOCCAL VACCINE: ICD-10-CM

## 2025-07-31 DIAGNOSIS — Z00.00 ANNUAL PHYSICAL EXAM: Primary | ICD-10-CM

## 2025-07-31 DIAGNOSIS — E55.9 VITAMIN D DEFICIENCY: ICD-10-CM

## 2025-07-31 DIAGNOSIS — M67.40 GANGLION CYST: ICD-10-CM

## 2025-07-31 DIAGNOSIS — E03.9 HYPOTHYROIDISM, UNSPECIFIED TYPE: ICD-10-CM

## 2025-07-31 DIAGNOSIS — R73.01 IMPAIRED FASTING GLUCOSE: ICD-10-CM

## 2025-07-31 DIAGNOSIS — H61.23 BILATERAL IMPACTED CERUMEN: ICD-10-CM

## 2025-07-31 DIAGNOSIS — Z12.31 BREAST CANCER SCREENING BY MAMMOGRAM: ICD-10-CM

## 2025-07-31 LAB
25(OH)D3 SERPL-MCNC: 53.3 NG/ML (ref 30–100)
ALBUMIN SERPL-MCNC: 4.8 G/DL (ref 3.5–5.2)
ALBUMIN/GLOB SERPL: 1.6 G/DL
ALP SERPL-CCNC: 121 U/L (ref 39–117)
ALT SERPL W P-5'-P-CCNC: 22 U/L (ref 1–33)
ANION GAP SERPL CALCULATED.3IONS-SCNC: 10.9 MMOL/L (ref 5–15)
AST SERPL-CCNC: 21 U/L (ref 1–32)
BILIRUB SERPL-MCNC: 0.5 MG/DL (ref 0–1.2)
BUN SERPL-MCNC: 17 MG/DL (ref 8–23)
BUN/CREAT SERPL: 17 (ref 7–25)
CALCIUM SPEC-SCNC: 10.1 MG/DL (ref 8.6–10.5)
CHLORIDE SERPL-SCNC: 103 MMOL/L (ref 98–107)
CHOLEST SERPL-MCNC: 197 MG/DL (ref 0–200)
CO2 SERPL-SCNC: 27.1 MMOL/L (ref 22–29)
CREAT SERPL-MCNC: 1 MG/DL (ref 0.57–1)
DEPRECATED RDW RBC AUTO: 42.4 FL (ref 37–54)
EGFRCR SERPLBLD CKD-EPI 2021: 63.4 ML/MIN/1.73
ERYTHROCYTE [DISTWIDTH] IN BLOOD BY AUTOMATED COUNT: 13.2 % (ref 12.3–15.4)
GLOBULIN UR ELPH-MCNC: 3 GM/DL
GLUCOSE SERPL-MCNC: 97 MG/DL (ref 65–99)
HBA1C MFR BLD: 5.5 % (ref 4.8–5.6)
HCT VFR BLD AUTO: 41 % (ref 34–46.6)
HDLC SERPL-MCNC: 58 MG/DL (ref 40–60)
HGB BLD-MCNC: 13.6 G/DL (ref 12–15.9)
LDLC SERPL CALC-MCNC: 120 MG/DL (ref 0–100)
LDLC/HDLC SERPL: 2.04 {RATIO}
MCH RBC QN AUTO: 29.2 PG (ref 26.6–33)
MCHC RBC AUTO-ENTMCNC: 33.2 G/DL (ref 31.5–35.7)
MCV RBC AUTO: 88 FL (ref 79–97)
PLATELET # BLD AUTO: 244 10*3/MM3 (ref 140–450)
PMV BLD AUTO: 10.6 FL (ref 6–12)
POTASSIUM SERPL-SCNC: 3.9 MMOL/L (ref 3.5–5.2)
PROT SERPL-MCNC: 7.8 G/DL (ref 6–8.5)
RBC # BLD AUTO: 4.66 10*6/MM3 (ref 3.77–5.28)
SODIUM SERPL-SCNC: 141 MMOL/L (ref 136–145)
TRIGL SERPL-MCNC: 104 MG/DL (ref 0–150)
TSH SERPL DL<=0.05 MIU/L-ACNC: 0.79 UIU/ML (ref 0.27–4.2)
VLDLC SERPL-MCNC: 19 MG/DL (ref 5–40)
WBC NRBC COR # BLD AUTO: 5.58 10*3/MM3 (ref 3.4–10.8)

## 2025-07-31 PROCEDURE — 82306 VITAMIN D 25 HYDROXY: CPT | Performed by: NURSE PRACTITIONER

## 2025-07-31 PROCEDURE — 83036 HEMOGLOBIN GLYCOSYLATED A1C: CPT | Performed by: NURSE PRACTITIONER

## 2025-07-31 PROCEDURE — 80050 GENERAL HEALTH PANEL: CPT | Performed by: NURSE PRACTITIONER

## 2025-07-31 PROCEDURE — 80061 LIPID PANEL: CPT | Performed by: NURSE PRACTITIONER

## 2025-07-31 RX ORDER — PAROXETINE 10 MG/1
10 TABLET, FILM COATED ORAL DAILY
Qty: 90 TABLET | Refills: 3 | Status: SHIPPED | OUTPATIENT
Start: 2025-07-31

## 2025-07-31 NOTE — PROGRESS NOTES
Ear Cerumen Removal       Date/Time: 7/31/25 320 pm   Performed by:wt   Authorized by: as      Anesthesia: None   Local Anesthetic: None   Location details: bilateral   Patient tolerance: Patient tolerated well   Complications: None   Procedure type: Ear Lavage   Sedation: None   Patient sedated: No

## 2025-07-31 NOTE — PROGRESS NOTES
Chief Complaint    Annual Exam  Annual Exam, Hypertension (Follow up), Ear Fullness (Especially left; feels clogged up), and Cyst (Left foot, been there awhile, does not hurt, thinks may have gotten bigger)    Subjective          Ana Plasencia is a 65 y.o. female who presents to [facility]    Annual Exam    History of Present Illness  The patient presents for a ganglion cyst on her left foot, cerumen impaction, weight management, hypertension, and health maintenance.    She has noticed an increase in the size of a lump on her left foot, which she believes is a cyst. It is not painful, but she is concerned about its potential to grow larger.    She reports a sensation of fullness in her ears and believes they may need cleaning. She also mentions worsening hoarseness but does not report any throat drainage.    She has been using a CPAP machine and is currently on Paxil, which she finds beneficial. She does not believe she has anxiety. She had anxiety that year. Her depression is gone.    She has been making efforts to lose weight, including maintaining a high-protein diet and ensuring adequate hydration. She admits to occasional indulgence in sweets. She is on Wegovy injection.    She reports no episodes of low blood pressure, lightheadedness, or dizziness. She is on amlodipine.    She had an eye exam. She was on a prescription for her eye. She came in with a red eye and did some blood work. It hurt, but eventually went away. She thought it was some infection in her body.    Diet: She maintains a high-protein diet and admits to occasional indulgence in sweets.  Sleep: She uses a CPAP machine.        Health Maintenance Due   Topic Date Due    TDAP/TD VACCINES (1 - Tdap) Never done    ZOSTER VACCINE (1 of 2) Never done    COVID-19 Vaccine (3 - 2024-25 season) 09/01/2024    ANNUAL PHYSICAL  06/10/2025          The PHQ has not been completed during this encounter.          Review of  Systems   HENT:  Positive for postnasal drip.    Skin:         Cyst of left foot     Allergic/Immunologic: Positive for environmental allergies.          Medical History: has a past medical history of Abnormal Pap smear of cervix, Allergic rhinitis, Anxiety, Arthritis, Back pain, Depression, Disease of thyroid gland, Essential hypertension, Glaucoma (05/20/2021), Gout, Hypothyroidism, Memory loss, Migraine headache, Night sweats, and Post-menopausal.     Surgical History: has a past surgical history that includes Tonsillectomy and [place] tooth extraction.     Family History: family history includes Alzheimer's disease in her mother; Diabetes in her father and sister; Heart disease in her father; Hypertension in her mother; Stroke in her maternal grandmother.     Social History: reports that she has never smoked. She has never used smokeless tobacco. She reports that she does not drink alcohol and does not use drugs.    Allergies: Patient has no known allergies.        Current Outpatient Medications:     amLODIPine (NORVASC) 5 MG tablet, TAKE 1 TABLET BY MOUTH ONCE DAILY FOR BLOOD PRESSURE, Disp: 90 tablet, Rfl: 1    bimatoprost (LUMIGAN) 0.03 % ophthalmic drops, , Disp: , Rfl:     Cholecalciferol (Vitamin D3) 50 MCG (2000 UT) tablet, TAKE 1 TABLET BY MOUTH ONCE DAILY, Disp: 90 tablet, Rfl: 2    hydroCHLOROthiazide 12.5 MG tablet, TAKE 1 TABLET BY MOUTH ONCE DAILY FOR FLUID, Disp: 90 tablet, Rfl: 0    levothyroxine (SYNTHROID, LEVOTHROID) 75 MCG tablet, TAKE 1 TABLET BY MOUTH EVERY MORNING TAKE ON EMPTY STOMACH FOR THYROID, Disp: 90 tablet, Rfl: 0    Loratadine 10 MG capsule, Take  by mouth., Disp: , Rfl:     PARoxetine (PAXIL) 10 MG tablet, Take 1 tablet by mouth Daily., Disp: 90 tablet, Rfl: 3    pravastatin (PRAVACHOL) 20 MG tablet, TAKE 1 TABLET BY MOUTH EVERY EVENING FOR CHOLESTEROL, Disp: 90 tablet, Rfl: 1    Semaglutide-Weight Management 2.4 MG/0.75ML solution auto-injector, Inject 2.4 mg under the skin into  "the appropriate area as directed 1 (One) Time Per Week., Disp: 3 mL, Rfl: 5      Immunization History   Administered Date(s) Administered    COVID-19 (MODERNA) 1st,2nd,3rd Dose Monovalent 01/12/2021, 02/09/2021    DTaP 09/04/2019    Fluzone (or Fluarix & Flulaval for VFC) >6mos 11/22/2021    Fluzone Quad >6mos (Multi-dose) 10/01/2020    PCV21 (CAPVAXIVE) 07/31/2025         Objective       Vitals:    07/31/25 1442   BP: 116/78   Pulse: 80   Temp: 98.5 °F (36.9 °C)   TempSrc: Temporal   SpO2: 96%   Weight: 88.8 kg (195 lb 12.8 oz)   Height: 162.6 cm (64.02\")      Body mass index is 33.59 kg/m².   Wt Readings from Last 3 Encounters:   07/31/25 88.8 kg (195 lb 12.8 oz)   01/20/25 95.2 kg (209 lb 12.8 oz)   10/07/24 96.6 kg (213 lb)      BP Readings from Last 3 Encounters:   07/31/25 116/78   01/20/25 126/79   10/07/24 129/63                 Physical Exam  Vitals reviewed.   Constitutional:       Appearance: Normal appearance. She is well-developed.   HENT:      Head: Normocephalic and atraumatic.      Right Ear: There is impacted cerumen.      Left Ear: There is impacted cerumen.      Mouth/Throat:      Pharynx: No posterior oropharyngeal erythema.      Tonsils: No tonsillar exudate.   Eyes:      Conjunctiva/sclera: Conjunctivae normal.      Pupils: Pupils are equal, round, and reactive to light.   Cardiovascular:      Rate and Rhythm: Normal rate and regular rhythm.      Heart sounds: Normal heart sounds. No murmur heard.  Pulmonary:      Effort: Pulmonary effort is normal.      Breath sounds: Normal breath sounds. No wheezing or rhonchi.   Abdominal:      General: Bowel sounds are normal. There is no distension.      Palpations: Abdomen is soft.      Tenderness: There is no abdominal tenderness.   Musculoskeletal:        Feet:    Feet:      Comments: 1. Ganglion cyst of left upper mid foot.   Skin:     General: Skin is warm and dry.   Neurological:      Mental Status: She is alert and oriented to person, place, and " time.   Psychiatric:         Mood and Affect: Mood and affect normal.         Behavior: Behavior normal.         Thought Content: Thought content normal.         Judgment: Judgment normal.       Physical Exam  Ears: Eardrums are not visible on either side.  Mouth/Throat: Throat is not red.  Neck: Neck was examined.  Extremities: Ganglion cyst on the left foot.      Result Review :       Common labs          1/20/2025    11:37   Common Labs   Glucose 93    BUN 16    Creatinine 0.90    Sodium 141    Potassium 4.0    Chloride 100    Calcium 10.0    Albumin 4.5    Total Bilirubin 0.4    Alkaline Phosphatase 120    AST (SGOT) 21    ALT (SGPT) 25    WBC 5.77    Hemoglobin 13.7    Hematocrit 40.5    Platelets 251    Total Cholesterol 179    Triglycerides 135    HDL Cholesterol 57    LDL Cholesterol  98    Hemoglobin A1C 5.60      Results  Labs   - Hemoglobin A1c: 5.6   - ARUNA: Normal      Ear Cerumen Removal    Date/Time: 7/31/2025 3:03 PM    Performed by: Nereyda Trivedi APRN  Authorized by: Nereyda Trivedi APRN  Location: [facility].  Patient tolerance: patient tolerated the procedure well with no immediate complications  Procedure type: irrigation   Sedation:  Patient sedated: no                  Assessment and Plan          Diagnoses and all orders for this visit:    1. Annual physical exam (Primary)    2. Hypothyroidism, unspecified type  -     TSH    3. Hyperlipidemia, unspecified hyperlipidemia type  -     Lipid Panel  -     Comprehensive Metabolic Panel    4. Essential hypertension  Assessment & Plan:  Hypertension is stable and controlled  Continue current treatment regimen.  Blood pressure will be reassessed in 6 months.    Orders:  -     CBC (No Diff)    5. Need for pneumococcal vaccine  -     Pneumococcal Conjugate Vaccine 21 (18+ yrs)    6. Breast cancer screening by mammogram  -     Mammo Screening Digital Tomosynthesis Bilateral With CAD; Future    7. Ganglion cyst  -     Ambulatory Referral to Podiatry    8.  Impaired fasting glucose  -     Hemoglobin A1c    9. Recurrent major depressive disorder, in partial remission  -     PARoxetine (PAXIL) 10 MG tablet; Take 1 tablet by mouth Daily.  Dispense: 90 tablet; Refill: 3    10. Vitamin D deficiency  -     Vitamin D,25-Hydroxy    11. Bilateral impacted cerumen  -     Ear Cerumen Removal        Assessment & Plan  1. Ganglion cyst on the left foot.  - The cyst has increased in size since first noticed.  - Physical examination confirms the presence of a ganglion cyst.  - Referral to podiatry for further evaluation and potential removal.  - Patient agrees to the referral.    2. Cerumen impaction.  - Patient reports ear stuffiness and potential need for ear cleaning.  - Physical examination reveals inability to visualize the eardrum on either side.  - Both ears will be flushed today to remove impacted cerumen.  - Patient consents to the procedure.    3. Weight management.  - Patient has lost 14 pounds since the last visit, with a total weight loss of 18 pounds since 10/07/2024.  - Blood pressure is well-controlled, and weight loss is noted.  - Patient is on the maximum dose of current weight loss medication and tolerating it well.  - Advised to continue current regimen, maintain a balanced diet with adequate protein intake, and monitor blood pressure for potential adjustments.    4. Hypertension.  - Blood pressure readings are within normal range.  - Advised to monitor blood pressure regularly, especially if experiencing dizziness or lightheadedness.  - If blood pressure is consistently low, discontinuation of the diuretic will be considered, followed by reassessment of amlodipine.  - Patient is informed of the potential need to adjust medications based on weight loss.    5. Anxiety.  - Patient reports long-term use of Paxil without current symptoms of anxiety.  - Paxil prescription has been refilled.  - Patient reports no current anxiety but continues medication as a  precaution.    6. Health maintenance.  - Patient is due for a mammogram in 10/2025, and the order has been placed.  - Patient is due for a shingles vaccine, which can be administered at a local pharmacy.  - Labs have been ordered for routine monitoring.    Follow-up: A follow-up visit is scheduled in 6 months.    PROCEDURE  Procedure: Ear flushing  - Procedural Discussion: Discussed the need for ear flushing due to inability to see the eardrum on either side. Patient agreed to the procedure.  - Technique: Both ears were flushed to remove any blockage.        Updated annual wellness visit checklist.  Immunizations discussed.  Screening discussed and/or ordered.  Recommend yearly dental and eye exams. Also discussed monitoring of blood pressure and lipids.      Patient was given instructions and counseling regarding her condition or for health maintenance advice. Please see specific information pulled into the AVS if appropriate.     Patient or patient representative verbalized consent for the use of Ambient Listening during the visit with  JAXSON Schaffer for chart documentation. 7/31/2025  15:04 EDT    JAXSON Schaffer

## 2025-08-01 ENCOUNTER — OFFICE VISIT (OUTPATIENT)
Dept: OBSTETRICS AND GYNECOLOGY | Age: 65
End: 2025-08-01
Payer: COMMERCIAL

## 2025-08-01 VITALS
SYSTOLIC BLOOD PRESSURE: 119 MMHG | HEART RATE: 78 BPM | WEIGHT: 199 LBS | BODY MASS INDEX: 34.14 KG/M2 | DIASTOLIC BLOOD PRESSURE: 73 MMHG

## 2025-08-01 DIAGNOSIS — Z12.12 SCREENING FOR MALIGNANT NEOPLASM OF THE RECTUM: ICD-10-CM

## 2025-08-01 DIAGNOSIS — Z12.11 SPECIAL SCREENING FOR MALIGNANT NEOPLASMS, COLON: ICD-10-CM

## 2025-08-01 DIAGNOSIS — Z01.419 ENCOUNTER FOR GYNECOLOGICAL EXAMINATION WITHOUT ABNORMAL FINDING: Primary | ICD-10-CM

## 2025-08-01 NOTE — PROGRESS NOTES
Well Woman Visit    CC: Scheduled annual well gyn visit  Chief Complaint   Patient presents with    Gynecologic Exam     wwe           HPI:   65 y.o.   Social History     Substance and Sexual Activity   Sexual Activity Not Currently    Partners: Male    Birth control/protection: Vasectomy       Menses:  denies any vaginal bleeding    PCP: does manage PMHx and preventative labs  History: PMHx, Meds, Allergies, PSHx, Social Hx, and POBHx all reviewed and updated.    Pt has no complaints today.    PHYSICAL EXAM:  /73   Pulse 78   Wt 90.3 kg (199 lb)   Breastfeeding No   BMI 34.14 kg/m²  Not found.     Exam conducted with a chaperone present  General- NAD, alert and oriented, appropriate  Psych- Normal mood, good memory  Neck- No masses, no thyroid enlargement  CV- Regular rhythm, no murnurs  Resp- CTA to bases, no wheezes  Abdomen- Soft, non distended, non tender, no masses    Breast left-  Bilaterally symmetrical, no masses, non tender, no nipple discharge  Breast right- Bilaterally symmetrical, no masses, non tender, no nipple discharge    External genitalia- Normal female, no lesions  Urethra/meatus- Normal, no masses, non tender  Bladder- Normal, no masses, non tender  Vagina- Normal, no atrophy, no lesions, no discharge.  Prolapse : none noted   Cvx- Normal, no lesions, no discharge, No cervical motion tenderness  Uterus- Normal size, shape & consistency.  Non tender, mobile.  Adnexa- No mass, non tender  Anus/Rectum/Perineum- Not performed    Lymphatic- No palpable neck, axillary, or groin nodes  Ext- No edema, no cyanosis    Skin- No lesions, no rashes, no acanthosis nigricans      ASSESSMENT and PLAN:    Diagnoses and all orders for this visit:    1. Encounter for gynecological examination without abnormal finding (Primary)        Preventative:  BREAST HEALTH- Monthly self breast exam importance and how to reviewed. MMG and/or MRI (prn) reviewed per society guidelines and her individual  history. Screen: Pt will call to schedule  CERVICAL CANCER Screening- Reviewed current ASCCP guidelines for screening w and wo cotest HPV, age specific.  Screen: Already up to date  COLON CANCER Screening- Reviewed current medical society guidelines and options.  Screen:  Updated today  SEXUAL HEALTH: Declines STD screening  VACCINATIONS Recommended: Covid vaccine, Flu vaccine annually.  Importance discussed, risk being unvaccinated reviewed.  Questions answered  Smoking status- NON SMOKER/VAPER        She understands the importance of having any ordered tests to be performed in a timely fashion.  The risks of not performing them include, but are not limited to, advanced cancer stages, bone loss from osteoporosis and/or subsequent increase in morbidity and/or mortality.  She is encouraged to review her results online and/or contact or office if she has questions.     Follow Up:  Return in about 1 year (around 8/1/2026) for Annual physical.        Ganga Pat, APRN  08/01/2025    [facility] [place] [id number]  [facility]  [facility] [place] [address]  Dept: [phone]  Dept Fax: [phone]  Loc: [phone]

## 2025-08-20 DIAGNOSIS — I10 ESSENTIAL HYPERTENSION: ICD-10-CM

## 2025-08-20 RX ORDER — HYDROCHLOROTHIAZIDE 12.5 MG/1
TABLET ORAL
Qty: 90 TABLET | Refills: 1 | Status: SHIPPED | OUTPATIENT
Start: 2025-08-20